# Patient Record
Sex: FEMALE | Race: BLACK OR AFRICAN AMERICAN | NOT HISPANIC OR LATINO | URBAN - METROPOLITAN AREA
[De-identification: names, ages, dates, MRNs, and addresses within clinical notes are randomized per-mention and may not be internally consistent; named-entity substitution may affect disease eponyms.]

---

## 2023-08-08 ENCOUNTER — HOSPITAL ENCOUNTER (OUTPATIENT)
Dept: DATA CONVERSION | Facility: HOSPITAL | Age: 48
Discharge: HOME | End: 2023-08-09

## 2023-08-08 DIAGNOSIS — R06.02 SHORTNESS OF BREATH: ICD-10-CM

## 2023-08-08 DIAGNOSIS — K21.9 GASTRO-ESOPHAGEAL REFLUX DISEASE WITHOUT ESOPHAGITIS: ICD-10-CM

## 2023-08-08 DIAGNOSIS — Z20.822 CONTACT WITH AND (SUSPECTED) EXPOSURE TO COVID-19: ICD-10-CM

## 2023-08-08 DIAGNOSIS — R42 DIZZINESS AND GIDDINESS: ICD-10-CM

## 2023-08-08 DIAGNOSIS — J45.909 UNSPECIFIED ASTHMA, UNCOMPLICATED (HHS-HCC): ICD-10-CM

## 2023-08-08 LAB
ANION GAP SERPL CALCULATED.3IONS-SCNC: 11 MMOL/L (ref 0–19)
BASOPHILS # BLD AUTO: 0.02 K/UL (ref 0–0.22)
BASOPHILS NFR BLD AUTO: 0.2 % (ref 0–1)
BUN SERPL-MCNC: 6 MG/DL (ref 8–25)
BUN/CREAT SERPL: 7.5 RATIO (ref 8–21)
CALCIUM SERPL-MCNC: 9.4 MG/DL (ref 8.5–10.4)
CHLORIDE SERPL-SCNC: 101 MMOL/L (ref 97–107)
CO2 SERPL-SCNC: 28 MMOL/L (ref 24–31)
CREAT SERPL-MCNC: 0.8 MG/DL (ref 0.4–1.6)
D DIMER PPP FEU-MCNC: 0.3 MG/L FEU (ref 0.19–0.5)
DEPRECATED RDW RBC AUTO: 43.7 FL (ref 37–54)
DIFFERENTIAL METHOD BLD: ABNORMAL
EOSINOPHIL # BLD AUTO: 0.12 K/UL (ref 0–0.45)
EOSINOPHIL NFR BLD: 1.3 % (ref 0–3)
ERYTHROCYTE [DISTWIDTH] IN BLOOD BY AUTOMATED COUNT: 13 % (ref 11.7–15)
GFR SERPL CREATININE-BSD FRML MDRD: 92 ML/MIN/1.73 M2
GLUCOSE SERPL-MCNC: 104 MG/DL (ref 65–99)
HCG SERPL-ACNC: 1 MIU/ML
HCT VFR BLD AUTO: 33.3 % (ref 36–44)
HGB BLD-MCNC: 10.8 GM/DL (ref 12–15)
HS TROPONIN T DELTA: 1 (ref 0–4)
HS TROPONIN T DELTA: NORMAL (ref 0–4)
IMM GRANULOCYTES # BLD AUTO: 0.02 K/UL (ref 0–0.1)
LYMPHOCYTES # BLD AUTO: 2.6 K/UL (ref 1.2–3.2)
LYMPHOCYTES NFR BLD MANUAL: 27.9 % (ref 20–40)
MCH RBC QN AUTO: 29.7 PG (ref 26–34)
MCHC RBC AUTO-ENTMCNC: 32.4 % (ref 31–37)
MCV RBC AUTO: 91.5 FL (ref 80–100)
MONOCYTES # BLD AUTO: 0.61 K/UL (ref 0–0.8)
MONOCYTES NFR BLD MANUAL: 6.5 % (ref 0–8)
NEUTROPHILS # BLD AUTO: 5.96 K/UL
NEUTROPHILS # BLD AUTO: 5.96 K/UL (ref 1.8–7.7)
NEUTROPHILS.IMMATURE NFR BLD: 0.2 % (ref 0–1)
NEUTS SEG NFR BLD: 63.9 % (ref 50–70)
NOTE (COV): NORMAL
NRBC BLD-RTO: 0 /100 WBC
PLATELET # BLD AUTO: 268 K/UL (ref 150–450)
PMV BLD AUTO: 10.3 CU (ref 7–12.6)
POTASSIUM SERPL-SCNC: 3.2 MMOL/L (ref 3.4–5.1)
RBC # BLD AUTO: 3.64 M/UL (ref 4–4.9)
SARS-COV-2 RNA RESP QL NAA+PROBE: NEGATIVE
SODIUM SERPL-SCNC: 140 MMOL/L (ref 133–145)
SPECIMEN SOURCE (COV): NORMAL
TROPONIN T SERPL-MCNC: 6 NG/L
TROPONIN T SERPL-MCNC: 7 NG/L
WBC # BLD AUTO: 9.3 K/UL (ref 4.5–11)

## 2023-09-05 ENCOUNTER — HOSPITAL ENCOUNTER (OUTPATIENT)
Dept: DATA CONVERSION | Facility: HOSPITAL | Age: 48
Discharge: HOME | End: 2023-09-05
Payer: COMMERCIAL

## 2023-09-05 DIAGNOSIS — Z13.6 ENCOUNTER FOR SCREENING FOR CARDIOVASCULAR DISORDERS: ICD-10-CM

## 2023-09-05 DIAGNOSIS — Z12.31 ENCOUNTER FOR SCREENING MAMMOGRAM FOR MALIGNANT NEOPLASM OF BREAST: ICD-10-CM

## 2023-10-05 PROBLEM — K21.9 GASTRO-ESOPHAGEAL REFLUX DISEASE WITHOUT ESOPHAGITIS: Status: ACTIVE | Noted: 2023-10-05

## 2023-10-05 PROBLEM — E01.0 THYROMEGALY: Status: ACTIVE | Noted: 2023-10-05

## 2023-10-05 PROBLEM — R06.00 DYSPNEA: Status: ACTIVE | Noted: 2023-10-05

## 2023-10-05 PROBLEM — R11.0 DAILY NAUSEA: Status: ACTIVE | Noted: 2023-10-05

## 2023-10-05 PROBLEM — Z86.010 HISTORY OF COLONIC POLYPS: Status: ACTIVE | Noted: 2023-10-05

## 2023-10-05 PROBLEM — R10.13 EPIGASTRIC PAIN: Status: ACTIVE | Noted: 2023-10-05

## 2023-10-05 PROBLEM — I10 ESSENTIAL HYPERTENSION: Status: ACTIVE | Noted: 2023-10-05

## 2023-10-05 PROBLEM — Z86.0100 HISTORY OF COLONIC POLYPS: Status: ACTIVE | Noted: 2023-10-05

## 2023-10-05 PROBLEM — K62.5 BRIGHT RED BLOOD PER RECTUM: Status: ACTIVE | Noted: 2023-10-05

## 2023-10-05 PROBLEM — D12.6 ADENOMATOUS POLYP OF COLON: Status: ACTIVE | Noted: 2023-10-05

## 2023-10-05 RX ORDER — OLOPATADINE HYDROCHLORIDE 1 MG/ML
1 SOLUTION/ DROPS OPHTHALMIC 2 TIMES DAILY
COMMUNITY

## 2023-10-05 RX ORDER — LEVOCETIRIZINE DIHYDROCHLORIDE 5 MG/1
1 TABLET, FILM COATED ORAL EVERY EVENING
COMMUNITY

## 2023-10-05 RX ORDER — FAMOTIDINE 40 MG/1
1 TABLET, FILM COATED ORAL DAILY
COMMUNITY
Start: 2023-08-15

## 2023-10-05 RX ORDER — IBUPROFEN 600 MG/1
1 TABLET ORAL EVERY 6 HOURS PRN
COMMUNITY

## 2023-10-05 RX ORDER — LORATADINE 10 MG/1
1 TABLET ORAL DAILY
COMMUNITY

## 2023-10-05 RX ORDER — OXYCODONE AND ACETAMINOPHEN 5; 325 MG/1; MG/1
1 TABLET ORAL EVERY 4 HOURS PRN
COMMUNITY
End: 2023-10-06 | Stop reason: WASHOUT

## 2023-10-05 RX ORDER — MONTELUKAST SODIUM 10 MG/1
1 TABLET ORAL EVERY EVENING
COMMUNITY
End: 2023-10-26

## 2023-10-05 RX ORDER — DOCUSATE SODIUM 100 MG/1
1 CAPSULE, LIQUID FILLED ORAL 2 TIMES DAILY
COMMUNITY

## 2023-10-05 RX ORDER — PANTOPRAZOLE SODIUM 40 MG/1
1 TABLET, DELAYED RELEASE ORAL DAILY
COMMUNITY
Start: 2021-07-01 | End: 2023-10-06 | Stop reason: WASHOUT

## 2023-10-05 RX ORDER — SERTRALINE HYDROCHLORIDE 25 MG/1
1 TABLET, FILM COATED ORAL DAILY
COMMUNITY
End: 2023-10-16

## 2023-10-05 RX ORDER — TRIAMTERENE/HYDROCHLOROTHIAZID 37.5-25 MG
1 TABLET ORAL EVERY MORNING
COMMUNITY
End: 2024-01-22

## 2023-10-05 RX ORDER — ALBUTEROL SULFATE 90 UG/1
2 AEROSOL, METERED RESPIRATORY (INHALATION) EVERY 4 HOURS PRN
COMMUNITY

## 2023-10-05 RX ORDER — OMEPRAZOLE 20 MG/1
1 TABLET, DELAYED RELEASE ORAL DAILY
COMMUNITY
End: 2023-10-06 | Stop reason: WASHOUT

## 2023-10-06 ENCOUNTER — TELEMEDICINE (OUTPATIENT)
Dept: PRIMARY CARE | Facility: CLINIC | Age: 48
End: 2023-10-06
Payer: COMMERCIAL

## 2023-10-06 DIAGNOSIS — J01.90 ACUTE NON-RECURRENT SINUSITIS, UNSPECIFIED LOCATION: Primary | ICD-10-CM

## 2023-10-06 PROCEDURE — 99212 OFFICE O/P EST SF 10 MIN: CPT | Performed by: INTERNAL MEDICINE

## 2023-10-06 RX ORDER — PROMETHAZINE HYDROCHLORIDE AND DEXTROMETHORPHAN HYDROBROMIDE 6.25; 15 MG/5ML; MG/5ML
5 SYRUP ORAL EVERY 4 HOURS PRN
Qty: 120 ML | Refills: 0 | Status: SHIPPED | OUTPATIENT
Start: 2023-10-06 | End: 2023-11-05

## 2023-10-06 RX ORDER — AZITHROMYCIN 250 MG/1
TABLET, FILM COATED ORAL
Qty: 6 TABLET | Refills: 0 | Status: SHIPPED | OUTPATIENT
Start: 2023-10-06 | End: 2023-10-11

## 2023-10-06 ASSESSMENT — PATIENT HEALTH QUESTIONNAIRE - PHQ9
2. FEELING DOWN, DEPRESSED OR HOPELESS: NOT AT ALL
1. LITTLE INTEREST OR PLEASURE IN DOING THINGS: NOT AT ALL
SUM OF ALL RESPONSES TO PHQ9 QUESTIONS 1 AND 2: 0

## 2023-10-06 ASSESSMENT — ENCOUNTER SYMPTOMS
CHOKING: 0
RHINORRHEA: 1
VOMITING: 0
DIZZINESS: 0
HEADACHES: 0
SINUS PAIN: 1
LOSS OF SENSATION IN FEET: 0
WHEEZING: 0
SORE THROAT: 0
NAUSEA: 0
DEPRESSION: 0
SHORTNESS OF BREATH: 0
OCCASIONAL FEELINGS OF UNSTEADINESS: 0
VOICE CHANGE: 1
COUGH: 1
CHILLS: 1
FEVER: 1

## 2023-10-06 NOTE — LETTER
October 6, 2023     Patient: Sheila Templeton   YOB: 1975   Date of Visit: 10/6/2023       To Whom It May Concern:    Sheila Templeton was seen in my clinic on 10/6/2023 at 2:30 pm. Please excuse Sheila for her absence from work on this day to make the appointment. Please also excuse her absences on 10/2 and 10/5.    If you have any questions or concerns, please don't hesitate to call.         Sincerely,         Hui Javier MD        CC: No Recipients

## 2023-10-06 NOTE — PROGRESS NOTES
Subjective   Patient ID: Sheila Templeton is a 47 y.o. female who presents for Sick Visit (Cold. Went to urgent care yesterday, received a COVID test (negative)).    This is a 47 y.o. presenting with nasal congestion, rhinorrhea, cough, tactile fever and chills x 5 days. She went to the  yesterday and was diagnoaed with sinusitis and was advised to take Flonase and provided with a rx for Benzonatate. She states that she is feeling no better.          Review of Systems   Constitutional:  Positive for chills and fever.   HENT:  Positive for congestion, rhinorrhea, sinus pain and voice change. Negative for sore throat.    Respiratory:  Positive for cough. Negative for choking, shortness of breath and wheezing.    Cardiovascular:  Negative for chest pain.   Gastrointestinal:  Negative for nausea and vomiting.   Neurological:  Negative for dizziness and headaches.       Objective   There were no vitals taken for this visit.    Physical Exam  Constitutional:       General: She is not in acute distress.     Appearance: She is ill-appearing. She is not toxic-appearing.   Neurological:      General: No focal deficit present.      Mental Status: She is alert and oriented to person, place, and time.   Psychiatric:         Mood and Affect: Mood normal.         Assessment/Plan   Diagnoses and all orders for this visit:  Acute non-recurrent sinusitis, unspecified location  -     azithromycin (Zithromax) 250 mg tablet; Take 2 tablets (500 mg) by mouth once daily for 1 day, THEN 1 tablet (250 mg) once daily for 4 days. Take 2 tabs (500 mg) by mouth today, than 1 daily for 4 days..  -     promethazine-DM (Phenergan-DM) 6.25-15 mg/5 mL syrup; Take 5 mL by mouth every 4 hours if needed for cough.

## 2023-10-13 ENCOUNTER — TELEPHONE (OUTPATIENT)
Dept: PRIMARY CARE | Facility: CLINIC | Age: 48
End: 2023-10-13
Payer: COMMERCIAL

## 2023-10-13 NOTE — TELEPHONE ENCOUNTER
Pt called stating PCP prescribed a zpack last week and she still is hoarse and has a cough. Pt is scheduled to come in on the 19th but was not sure if she needs to be seen sooner. Pt is also asking if PCP has any recommendations for pediatrics for her grandson. Please advise.

## 2023-10-16 NOTE — TELEPHONE ENCOUNTER
She can be seen in [facility], if I have no earlier availability. She can try [facility] for her grandson.

## 2023-10-19 ENCOUNTER — OFFICE VISIT (OUTPATIENT)
Dept: PRIMARY CARE | Facility: CLINIC | Age: 48
End: 2023-10-19
Payer: COMMERCIAL

## 2023-10-19 VITALS
WEIGHT: 244.4 LBS | HEART RATE: 85 BPM | SYSTOLIC BLOOD PRESSURE: 120 MMHG | TEMPERATURE: 97.3 F | OXYGEN SATURATION: 97 % | DIASTOLIC BLOOD PRESSURE: 78 MMHG | BODY MASS INDEX: 43.29 KG/M2

## 2023-10-19 DIAGNOSIS — J20.9 ACUTE BRONCHITIS, UNSPECIFIED ORGANISM: Primary | ICD-10-CM

## 2023-10-19 PROCEDURE — 2500000004 HC RX 250 GENERAL PHARMACY W/ HCPCS (ALT 636 FOR OP/ED): Performed by: INTERNAL MEDICINE

## 2023-10-19 PROCEDURE — 96372 THER/PROPH/DIAG INJ SC/IM: CPT | Performed by: INTERNAL MEDICINE

## 2023-10-19 PROCEDURE — 3074F SYST BP LT 130 MM HG: CPT | Performed by: INTERNAL MEDICINE

## 2023-10-19 PROCEDURE — 3078F DIAST BP <80 MM HG: CPT | Performed by: INTERNAL MEDICINE

## 2023-10-19 PROCEDURE — 1036F TOBACCO NON-USER: CPT | Performed by: INTERNAL MEDICINE

## 2023-10-19 PROCEDURE — 99213 OFFICE O/P EST LOW 20 MIN: CPT | Performed by: INTERNAL MEDICINE

## 2023-10-19 RX ORDER — METHYLPREDNISOLONE ACETATE 80 MG/ML
80 INJECTION, SUSPENSION INTRA-ARTICULAR; INTRALESIONAL; INTRAMUSCULAR; SOFT TISSUE ONCE
Status: COMPLETED | OUTPATIENT
Start: 2023-10-19 | End: 2023-10-19

## 2023-10-19 RX ORDER — HYDROCODONE BITARTRATE AND HOMATROPINE METHYLBROMIDE ORAL SOLUTION 5; 1.5 MG/5ML; MG/5ML
5 LIQUID ORAL EVERY 6 HOURS PRN
Qty: 100 ML | Refills: 0 | Status: SHIPPED | OUTPATIENT
Start: 2023-10-19 | End: 2023-10-24

## 2023-10-19 RX ORDER — DOXYCYCLINE 100 MG/1
100 CAPSULE ORAL 2 TIMES DAILY
Qty: 14 CAPSULE | Refills: 0 | Status: SHIPPED | OUTPATIENT
Start: 2023-10-19 | End: 2023-10-26

## 2023-10-19 RX ADMIN — METHYLPREDNISOLONE ACETATE 80 MG: 80 INJECTION, SUSPENSION INTRA-ARTICULAR; INTRALESIONAL; INTRAMUSCULAR; SOFT TISSUE at 11:17

## 2023-10-19 ASSESSMENT — PATIENT HEALTH QUESTIONNAIRE - PHQ9
1. LITTLE INTEREST OR PLEASURE IN DOING THINGS: NOT AT ALL
SUM OF ALL RESPONSES TO PHQ9 QUESTIONS 1 AND 2: 0
2. FEELING DOWN, DEPRESSED OR HOPELESS: NOT AT ALL

## 2023-10-19 ASSESSMENT — ENCOUNTER SYMPTOMS
LOSS OF SENSATION IN FEET: 0
OCCASIONAL FEELINGS OF UNSTEADINESS: 0
DEPRESSION: 0

## 2023-10-19 NOTE — PROGRESS NOTES
Subjective   Patient ID: Sheila Templeton is a 47 y.o. female who presents for Cough (Hot flashes and chills. ).    This is a 47 y.o. presenting with continued cough and congestion x 2 weeks. She was treated with Zithromax and Promethazine DM and felt better briefly, but her symptoms returned. She has been using her inhaler with not much relief. She has not had fever, but admits to chills and hot flashes. She is also having urinary incontinence due to coughing. No wheezing, dizziness.         Review of Systems   All other systems reviewed and are negative.      Objective   /78   Pulse 85   Temp 36.3 °C (97.3 °F)   Wt 111 kg (244 lb 6.4 oz)   SpO2 97%   BMI 43.29 kg/m²     Physical Exam  Constitutional:       General: She is not in acute distress.     Appearance: She is ill-appearing. She is not toxic-appearing.   HENT:      Nose: Nose normal.   Cardiovascular:      Rate and Rhythm: Normal rate and regular rhythm.      Heart sounds: Normal heart sounds.   Pulmonary:      Effort: Pulmonary effort is normal. No respiratory distress.      Breath sounds: Normal breath sounds. No stridor. No wheezing, rhonchi or rales.   Skin:     General: Skin is warm and dry.   Neurological:      General: No focal deficit present.      Mental Status: She is alert and oriented to person, place, and time.   Psychiatric:         Mood and Affect: Mood normal.         Assessment/Plan   Diagnoses and all orders for this visit:  Acute bronchitis, unspecified organism  -     doxycycline (Vibramycin) 100 mg capsule; Take 1 capsule (100 mg) by mouth 2 times a day for 7 days. Take with at least 8 ounces (large glass) of water, do not lie down for 30 minutes after  -     hydrocodone-homatropine (Hycodan) 5-1.5 mg/5 mL syrup; Take 5 mL by mouth every 6 hours if needed for cough for up to 5 days.  -     methylPREDNISolone acetate (DEPO-Medrol) injection 80 mg

## 2023-10-19 NOTE — LETTER
October 19, 2023     Patient: Sheila Templeton   YOB: 1975   Date of Visit: 10/19/2023       To Whom It May Concern:    Sheila Templeton was seen in my clinic on 10/19/2023 at 10:45 am. Please excuse Sheila for her absence from work on this day to make the appointment.    If you have any questions or concerns, please don't hesitate to call.         Sincerely,         Hui Javier MD        CC: No Recipients

## 2023-10-26 DIAGNOSIS — L20.9 ATOPIC DERMATITIS, UNSPECIFIED: ICD-10-CM

## 2023-10-26 RX ORDER — MONTELUKAST SODIUM 10 MG/1
10 TABLET ORAL EVERY EVENING
Qty: 90 TABLET | Refills: 3 | Status: SHIPPED | OUTPATIENT
Start: 2023-10-26 | End: 2024-02-02 | Stop reason: ALTCHOICE

## 2023-10-26 RX ORDER — HYDROCORTISONE 25 MG/G
CREAM TOPICAL
Qty: 56.7 G | Refills: 3 | Status: SHIPPED | OUTPATIENT
Start: 2023-10-26

## 2024-02-02 ENCOUNTER — OFFICE VISIT (OUTPATIENT)
Dept: PRIMARY CARE | Facility: CLINIC | Age: 49
End: 2024-02-02
Payer: COMMERCIAL

## 2024-02-02 VITALS
DIASTOLIC BLOOD PRESSURE: 80 MMHG | HEART RATE: 73 BPM | SYSTOLIC BLOOD PRESSURE: 122 MMHG | TEMPERATURE: 97.1 F | BODY MASS INDEX: 43.22 KG/M2 | WEIGHT: 244 LBS | OXYGEN SATURATION: 97 %

## 2024-02-02 DIAGNOSIS — Z00.00 PHYSICAL EXAM: Primary | ICD-10-CM

## 2024-02-02 DIAGNOSIS — E66.01 CLASS 3 SEVERE OBESITY DUE TO EXCESS CALORIES WITHOUT SERIOUS COMORBIDITY WITH BODY MASS INDEX (BMI) OF 40.0 TO 44.9 IN ADULT (MULTI): ICD-10-CM

## 2024-02-02 PROCEDURE — 3008F BODY MASS INDEX DOCD: CPT | Performed by: INTERNAL MEDICINE

## 2024-02-02 PROCEDURE — 3074F SYST BP LT 130 MM HG: CPT | Performed by: INTERNAL MEDICINE

## 2024-02-02 PROCEDURE — 3079F DIAST BP 80-89 MM HG: CPT | Performed by: INTERNAL MEDICINE

## 2024-02-02 PROCEDURE — 1036F TOBACCO NON-USER: CPT | Performed by: INTERNAL MEDICINE

## 2024-02-02 PROCEDURE — 99396 PREV VISIT EST AGE 40-64: CPT | Performed by: INTERNAL MEDICINE

## 2024-02-02 ASSESSMENT — PAIN SCALES - GENERAL: PAINLEVEL: 0-NO PAIN

## 2024-02-02 NOTE — PROGRESS NOTES
Subjective   Patient ID: Sheila Templeton is a 48 y.o. female who presents for Annual Exam.    HPI  1.  Physical exam. Pt is interested in weight loss mgmt. She is eating healthier and has started exercising. She is inquiring about Zepbound.  Pap: last done, s/p KASIA  Mammogram: last done 9/2023  Colonoscopy: last done 2016      Review of Systems  All pertinent positive symptoms are included in the history of present illness.    All other systems have been reviewed and are negative and noncontributory to this patient's current ailments.    Past Medical History:   Diagnosis Date    Other specified postprocedural states     History of myomectomy    Personal history of other benign neoplasm     History of uterine leiomyoma     Past Surgical History:   Procedure Laterality Date    ENDOMETRIAL ABLATION  05/02/2016    [facility] Thermal Endometrial Ablation    OTHER SURGICAL HISTORY  05/02/2016    Laparoscopy Myomectomy    TUBAL LIGATION  05/02/2016    Tubal Ligation     Social History     Tobacco Use    Smoking status: Never    Smokeless tobacco: Never   Vaping Use    Vaping Use: Never used   Substance Use Topics    Alcohol use: Never    Drug use: Never     Family History   Problem Relation Name Age of Onset    Hypertension Mother      No Known Problems Father      Asthma Brother      No Known Problems Daughter      No Known Problems Son      Ovarian cancer Maternal Grandmother       Allergies   Allergen Reactions    Fluconazole Anaphylaxis and Unknown     Immunization History   Administered Date(s) Administered    Tdap vaccine, age 7 year and older (BOOSTRIX, ADACEL) 11/08/2011     Current Outpatient Medications   Medication Instructions    albuterol (Ventolin HFA) 90 mcg/actuation inhaler 2 puffs, inhalation, Every 4 hours PRN    BACILLUS COAGULANS-INULIN ORAL 1 capsule, oral, Daily    docusate sodium (Colace) 100 mg capsule 1 capsule, oral, 2 times daily    famotidine (Pepcid) 40 mg tablet 1 tablet, oral,  Daily    hydrocortisone 2.5 % cream 1 APPLICATION TO AFFECTED AREA AS NEEDED EXTERNALLY TWICE A DAY    ibuprofen 600 mg tablet 1 tablet, oral, Every 6 hours PRN    levocetirizine (Xyzal) 5 mg tablet 1 tablet, oral, Every evening    loratadine (Claritin) 10 mg tablet 1 tablet, oral, Daily    olopatadine (Patanol) 0.1 % ophthalmic solution 1 drop, ophthalmic (eye), 2 times daily    sertraline (ZOLOFT) 25 mg, oral, Daily    triamterene-hydrochlorothiazid (Maxzide-25) 37.5-25 mg tablet 1 tablet, oral, Daily before breakfast     Objective   Visit Vitals  /80   Pulse 73   Temp 36.2 °C (97.1 °F)   Wt 111 kg (244 lb)   SpO2 97%   BMI 43.22 kg/m²   Smoking Status Never   BSA 2.22 m²     No visits with results within 1 Month(s) from this visit.   Latest known visit with results is:   Legacy Encounter on 12/22/2022   Component Date Value    WBC 12/22/2022 9.3     RBC 12/22/2022 4.18     Hemoglobin 12/22/2022 12.2     Hematocrit 12/22/2022 38.8     MCV 12/22/2022 92.8     MCH 12/22/2022 29.2     MCHC 12/22/2022 31.4     RDW-SD 12/22/2022 45.2     RDW-CV 12/22/2022 13.2     Platelets 12/22/2022 347     MPV 12/22/2022 10.7     nRBC 12/22/2022 0     Calcium 12/22/2022 9.4     AST 12/22/2022 14     Alkaline Phosphatase 12/22/2022 78     Total Bilirubin 12/22/2022 0.5     Total Protein 12/22/2022 7.7     Albumin 12/22/2022 3.8     Globulin, Total 12/22/2022 3.9 (H)     A/G Ratio 12/22/2022 1.0 (L)     Sodium 12/22/2022 141     Potassium 12/22/2022 3.7     Chloride 12/22/2022 104     Bicarbonate 12/22/2022 28     Anion Gap 12/22/2022 9     Urea Nitrogen 12/22/2022 7 (L)     Creatinine 12/22/2022 0.9     Urea Nitrogen/Creatinine* 12/22/2022 7.8 (L)     Glucose 12/22/2022 105 (H)     ALT (SGPT) 12/22/2022 10     ESTIMATED GFR 12/22/2022 80     SERUM COLOR 12/22/2022 YELLOW     SERUM CLARITY 12/22/2022 CLEAR     Is the patient fasting? 12/22/2022 FASTING     Cholesterol 12/22/2022 182     Triglycerides 12/22/2022 89     HDL  12/22/2022 62     LDL Calculated 12/22/2022 102     Cholesterol/HDL Ratio 12/22/2022 2.9     Thyroid Stimulating Horm* 12/22/2022 1.43     Microscopic 12/22/2022 AUTOMATIC MICROSCOPIC URINES     WBC, Urine 12/22/2022 1     RBC, Urine 12/22/2022 2     Bacteria, Urine 12/22/2022 NEGATIVE     Squamous Epithelial Cell* 12/22/2022 FEW     Hyaline Casts, Urine 12/22/2022 NONE SEEN     Color, Urine 12/22/2022 PALE YELLOW     Appearance, Urine 12/22/2022 CLEAR     Specific Gravity, Urine 12/22/2022 1.020     pH, Urine 12/22/2022 6.0     Leukocyte Esterase, Urine 12/22/2022 TRACE (A)     Nitrite, Urine 12/22/2022 NEGATIVE     Protein, Urine 12/22/2022 TRACE (A)     Glucose, Urine 12/22/2022 NEGATIVE     Ketones, Urine 12/22/2022 NEGATIVE     Urobilinogen, Urine 12/22/2022 NORMAL     Bilirubin, Urine 12/22/2022 NEGATIVE     Blood, Urine 12/22/2022 NEGATIVE     Urine Culture 12/22/2022                      Value:CULTURE BEING ORDERED BASED ON LEUKOCYTE ESTERASE  Performed at [address] [address]      Specimen Description 12/22/2022                      Value:CLEAN VOIDED MIDSTREAM Performed at [address] [address]      Special Requests 12/22/2022 NONE Performed at [address] [address]     [place] Count 12/22/2022 >100,000 CFU/mL     RESULTS 12/22/2022                      Value:NORMAL UROGENITAL JUAN  INCLUDING  STREPTOCOCCUS AGALACTIAE_GROUP   B(BETA)  Performed at [facility],[address] [place] [id number]      REPORT STATUS - SQ AKUA* 12/22/2022 FINAL 12/23/2022      Physical Exam  CONSTITUTIONAL - well nourished, well developed, looks like stated age, in no acute distress, not ill-appearing, and not tired appearing  SKIN - normal skin color and pigmentation, normal skin turgor without rash, lesions, or nodules visualized  HEAD - no trauma, normocephalic  EYES - pupils are equal and reactive to light, extraocular muscles are intact, and  normal external exam  ENT - TM's intact, no injection, no signs of infection, uvula midline, normal tongue movement and throat normal, no exudate, nasal passage without discharge and patent  NECK - supple without rigidity, no neck mass was observed, no thyromegaly or thyroid nodules  CHEST - clear to auscultation, no wheezing, no crackles and no rales, good effort  CARDIAC - regular rate and regular rhythm, no skipped beats, no murmur  ABDOMEN - no organomegaly, soft, nontender, nondistended, normal bowel sounds, no guarding/rebound/rigidity, negative McBurney sign and negative Rubio sign  EXTREMITIES - no edema, no deformities  NEUROLOGICAL - normal gait, normal balance, normal motor, no ataxia; alert, oriented and no focal signs  PSYCHIATRIC - alert, pleasant and cordial, age-appropriate  IMMUNOLOGIC - no cervical lymphadenopathy    Assessment/Plan   Problem List Items Addressed This Visit    None  Visit Diagnoses       Physical exam    -  Primary    Relevant Orders    Lipid Panel    Comprehensive Metabolic Panel    CBC    Hemoglobin A1C    Class 3 severe obesity due to excess calories without serious comorbidity with body mass index (BMI) of 40.0 to 44.9 in adult (CMS/[facility])        Will await lab results to determine which wt loss aid to prescribe         Healthy diet and exercise encouraged. Low fat, low salt diet. Drink plenty of fluids. Exercise at least 5 times/week for at least 45 minutes per day.

## 2024-02-03 ENCOUNTER — LAB (OUTPATIENT)
Dept: LAB | Facility: LAB | Age: 49
End: 2024-02-03
Payer: COMMERCIAL

## 2024-02-03 DIAGNOSIS — Z00.00 PHYSICAL EXAM: ICD-10-CM

## 2024-02-03 LAB
ALBUMIN SERPL BCP-MCNC: 3.8 G/DL (ref 3.4–5)
ALP SERPL-CCNC: 68 U/L (ref 33–110)
ALT SERPL W P-5'-P-CCNC: 11 U/L (ref 7–45)
ANION GAP SERPL CALC-SCNC: 11 MMOL/L (ref 10–20)
AST SERPL W P-5'-P-CCNC: 11 U/L (ref 9–39)
BILIRUB SERPL-MCNC: 0.5 MG/DL (ref 0–1.2)
BUN SERPL-MCNC: 10 MG/DL (ref 6–23)
CALCIUM SERPL-MCNC: 9.3 MG/DL (ref 8.6–10.6)
CHLORIDE SERPL-SCNC: 102 MMOL/L (ref 98–107)
CHOLEST SERPL-MCNC: 162 MG/DL (ref 0–199)
CHOLESTEROL/HDL RATIO: 2.8
CO2 SERPL-SCNC: 27 MMOL/L (ref 21–32)
CREAT SERPL-MCNC: 0.79 MG/DL (ref 0.5–1.05)
EGFRCR SERPLBLD CKD-EPI 2021: >90 ML/MIN/1.73M*2
ERYTHROCYTE [DISTWIDTH] IN BLOOD BY AUTOMATED COUNT: 13.5 % (ref 11.5–14.5)
EST. AVERAGE GLUCOSE BLD GHB EST-MCNC: 111 MG/DL
GLUCOSE SERPL-MCNC: 92 MG/DL (ref 74–99)
HBA1C MFR BLD: 5.5 %
HCT VFR BLD AUTO: 36.6 % (ref 36–46)
HDLC SERPL-MCNC: 57.5 MG/DL
HGB BLD-MCNC: 11.9 G/DL (ref 12–16)
LDLC SERPL CALC-MCNC: 87 MG/DL
MCH RBC QN AUTO: 30 PG (ref 26–34)
MCHC RBC AUTO-ENTMCNC: 32.5 G/DL (ref 32–36)
MCV RBC AUTO: 92 FL (ref 80–100)
NON HDL CHOLESTEROL: 105 MG/DL (ref 0–149)
NRBC BLD-RTO: 0 /100 WBCS (ref 0–0)
PLATELET # BLD AUTO: 286 X10*3/UL (ref 150–450)
POTASSIUM SERPL-SCNC: 3.5 MMOL/L (ref 3.5–5.3)
PROT SERPL-MCNC: 7.2 G/DL (ref 6.4–8.2)
RBC # BLD AUTO: 3.97 X10*6/UL (ref 4–5.2)
SODIUM SERPL-SCNC: 136 MMOL/L (ref 136–145)
TRIGL SERPL-MCNC: 87 MG/DL (ref 0–149)
VLDL: 17 MG/DL (ref 0–40)
WBC # BLD AUTO: 9.2 X10*3/UL (ref 4.4–11.3)

## 2024-02-03 PROCEDURE — 83036 HEMOGLOBIN GLYCOSYLATED A1C: CPT

## 2024-02-03 PROCEDURE — 80053 COMPREHEN METABOLIC PANEL: CPT

## 2024-02-03 PROCEDURE — 80061 LIPID PANEL: CPT

## 2024-02-03 PROCEDURE — 85027 COMPLETE CBC AUTOMATED: CPT

## 2024-02-03 PROCEDURE — 36415 COLL VENOUS BLD VENIPUNCTURE: CPT

## 2024-03-08 PROBLEM — J45.909 ASTHMA WITHOUT STATUS ASTHMATICUS (HHS-HCC): Status: ACTIVE | Noted: 2023-08-08

## 2024-03-20 ENCOUNTER — E-VISIT (OUTPATIENT)
Dept: PRIMARY CARE | Facility: CLINIC | Age: 49
End: 2024-03-20
Payer: COMMERCIAL

## 2024-03-20 DIAGNOSIS — E66.01 MORBID OBESITY WITH BODY MASS INDEX (BMI) OF 40.0 TO 44.9 IN ADULT (MULTI): ICD-10-CM

## 2024-03-20 DIAGNOSIS — N39.3 STRESS INCONTINENCE OF URINE: ICD-10-CM

## 2024-03-20 DIAGNOSIS — R10.2 PELVIC PAIN: Primary | ICD-10-CM

## 2024-03-20 NOTE — TELEPHONE ENCOUNTER
What is the referral for? Is she experiencing pelvic pain, urinary incontinence. We discussed Zepbound at her appt for weight loss. Would she like to try this medication?

## 2024-03-25 ENCOUNTER — TELEPHONE (OUTPATIENT)
Dept: PRIMARY CARE | Facility: CLINIC | Age: 49
End: 2024-03-25
Payer: COMMERCIAL

## 2024-03-25 NOTE — TELEPHONE ENCOUNTER
Per pt message below:    Sheila CLARK [facility] Clinical Support Staff (supporting Hui Javier MD)3 days ago     [facility]  Thank you. [facility] has said they need additional information before they fill thr prescription for Zepbound

## 2024-04-19 ENCOUNTER — LAB REQUISITION (OUTPATIENT)
Dept: LAB | Facility: HOSPITAL | Age: 49
End: 2024-04-19
Payer: COMMERCIAL

## 2024-04-19 DIAGNOSIS — R30.0 DYSURIA: ICD-10-CM

## 2024-04-19 PROCEDURE — 87186 SC STD MICRODIL/AGAR DIL: CPT

## 2024-04-19 PROCEDURE — 87086 URINE CULTURE/COLONY COUNT: CPT

## 2024-04-20 ENCOUNTER — LAB REQUISITION (OUTPATIENT)
Dept: LAB | Facility: HOSPITAL | Age: 49
End: 2024-04-20
Payer: COMMERCIAL

## 2024-04-20 DIAGNOSIS — R30.0 DYSURIA: ICD-10-CM

## 2024-04-22 LAB — BACTERIA UR CULT: ABNORMAL

## 2024-07-31 ENCOUNTER — HOSPITAL ENCOUNTER (EMERGENCY)
Facility: HOSPITAL | Age: 49
Discharge: HOME | End: 2024-07-31
Payer: COMMERCIAL

## 2024-07-31 VITALS
SYSTOLIC BLOOD PRESSURE: 152 MMHG | RESPIRATION RATE: 18 BRPM | TEMPERATURE: 98.4 F | WEIGHT: 230 LBS | BODY MASS INDEX: 40.75 KG/M2 | OXYGEN SATURATION: 98 % | HEART RATE: 71 BPM | DIASTOLIC BLOOD PRESSURE: 93 MMHG | HEIGHT: 63 IN

## 2024-07-31 DIAGNOSIS — R51.9 ACUTE NONINTRACTABLE HEADACHE, UNSPECIFIED HEADACHE TYPE: Primary | ICD-10-CM

## 2024-07-31 PROCEDURE — 96375 TX/PRO/DX INJ NEW DRUG ADDON: CPT

## 2024-07-31 PROCEDURE — 96374 THER/PROPH/DIAG INJ IV PUSH: CPT

## 2024-07-31 PROCEDURE — 2500000004 HC RX 250 GENERAL PHARMACY W/ HCPCS (ALT 636 FOR OP/ED)

## 2024-07-31 PROCEDURE — 99284 EMERGENCY DEPT VISIT MOD MDM: CPT

## 2024-07-31 RX ORDER — PROCHLORPERAZINE EDISYLATE 5 MG/ML
10 INJECTION INTRAMUSCULAR; INTRAVENOUS ONCE
Status: COMPLETED | OUTPATIENT
Start: 2024-07-31 | End: 2024-07-31

## 2024-07-31 RX ORDER — DIPHENHYDRAMINE HYDROCHLORIDE 50 MG/ML
25 INJECTION INTRAMUSCULAR; INTRAVENOUS ONCE
Status: COMPLETED | OUTPATIENT
Start: 2024-07-31 | End: 2024-07-31

## 2024-07-31 RX ORDER — KETOROLAC TROMETHAMINE 30 MG/ML
30 INJECTION, SOLUTION INTRAMUSCULAR; INTRAVENOUS ONCE
Status: COMPLETED | OUTPATIENT
Start: 2024-07-31 | End: 2024-07-31

## 2024-07-31 ASSESSMENT — PAIN DESCRIPTION - LOCATION: LOCATION: HEAD

## 2024-07-31 ASSESSMENT — LIFESTYLE VARIABLES
EVER FELT BAD OR GUILTY ABOUT YOUR DRINKING: NO
EVER HAD A DRINK FIRST THING IN THE MORNING TO STEADY YOUR NERVES TO GET RID OF A HANGOVER: NO
HAVE YOU EVER FELT YOU SHOULD CUT DOWN ON YOUR DRINKING: NO
TOTAL SCORE: 0
HAVE PEOPLE ANNOYED YOU BY CRITICIZING YOUR DRINKING: NO

## 2024-07-31 ASSESSMENT — PAIN DESCRIPTION - PAIN TYPE
TYPE: ACUTE PAIN
TYPE: ACUTE PAIN

## 2024-07-31 ASSESSMENT — PAIN SCALES - GENERAL
PAINLEVEL_OUTOF10: 5 - MODERATE PAIN
PAINLEVEL_OUTOF10: 5 - MODERATE PAIN

## 2024-07-31 ASSESSMENT — COLUMBIA-SUICIDE SEVERITY RATING SCALE - C-SSRS
6. HAVE YOU EVER DONE ANYTHING, STARTED TO DO ANYTHING, OR PREPARED TO DO ANYTHING TO END YOUR LIFE?: NO
2. HAVE YOU ACTUALLY HAD ANY THOUGHTS OF KILLING YOURSELF?: NO
1. IN THE PAST MONTH, HAVE YOU WISHED YOU WERE DEAD OR WISHED YOU COULD GO TO SLEEP AND NOT WAKE UP?: NO

## 2024-07-31 ASSESSMENT — PAIN - FUNCTIONAL ASSESSMENT
PAIN_FUNCTIONAL_ASSESSMENT: 0-10
PAIN_FUNCTIONAL_ASSESSMENT: 0-10

## 2024-07-31 ASSESSMENT — PAIN DESCRIPTION - DESCRIPTORS
DESCRIPTORS: ACHING
DESCRIPTORS: ACHING

## 2024-07-31 NOTE — ED PROVIDER NOTES
HPI   Chief Complaint   Patient presents with    Headache       Patient is a 48-year-old female presenting to the emergency department for evaluation of headache.  Patient states she has had a headache since this morning.  She states she has a history of migraine headaches more when she was younger.  She states she took some Tylenol and ibuprofen earlier today with not much relief in the headache.  She states that the headache is a constant throbbing pain across her forehead.  She denies any light sensitivity.  She denies any changes in vision.  She denies chest pain, shortness of breath,  fever, chills, nausea, vomiting, abdominal pain, cough, congestion, numbness, tingling.               Patient History   Past Medical History:   Diagnosis Date    Other specified postprocedural states     History of myomectomy    Personal history of other benign neoplasm     History of uterine leiomyoma     Past Surgical History:   Procedure Laterality Date    ENDOMETRIAL ABLATION  05/02/2016    [facility] Services Thermal Endometrial Ablation    OTHER SURGICAL HISTORY  05/02/2016    Laparoscopy Myomectomy    TUBAL LIGATION  05/02/2016    Tubal Ligation     Family History   Problem Relation Name Age of Onset    Hypertension Mother      No Known Problems Father      Asthma Brother      No Known Problems Daughter      No Known Problems Son      Ovarian cancer Maternal Grandmother       Social History     Tobacco Use    Smoking status: Never    Smokeless tobacco: Never   Vaping Use    Vaping status: Never Used   Substance Use Topics    Alcohol use: Never    Drug use: Never       Physical Exam   ED Triage Vitals [07/31/24 1853]   Temperature Heart Rate Respirations BP   36.9 °C (98.4 °F) 71 18 (!) 152/93      Pulse Ox Temp src Heart Rate Source Patient Position   98 % -- -- --      BP Location FiO2 (%)     -- --       Physical Exam  Vitals and nursing note reviewed.   Constitutional:       General: She is not in acute distress.      Appearance: She is well-developed. She is not ill-appearing or toxic-appearing.   HENT:      Head: Normocephalic and atraumatic.      Mouth/Throat:      Mouth: Mucous membranes are moist.   Eyes:      General: No visual field deficit.     Extraocular Movements: Extraocular movements intact.      Right eye: Normal extraocular motion and no nystagmus.      Left eye: Normal extraocular motion and no nystagmus.      Pupils: Pupils are equal, round, and reactive to light.      Right eye: Pupil is round and reactive.      Left eye: Pupil is round and reactive.   Cardiovascular:      Rate and Rhythm: Normal rate and regular rhythm.      Heart sounds: Normal heart sounds.   Pulmonary:      Effort: Pulmonary effort is normal.      Breath sounds: Normal breath sounds.   Abdominal:      Palpations: Abdomen is soft.   Musculoskeletal:         General: Normal range of motion.      Cervical back: Normal range of motion.   Skin:     General: Skin is warm and dry.   Neurological:      Mental Status: She is alert and oriented to person, place, and time.      GCS: GCS eye subscore is 4. GCS verbal subscore is 5. GCS motor subscore is 6.      Cranial Nerves: No cranial nerve deficit (2-12), dysarthria or facial asymmetry.      Sensory: No sensory deficit.      Motor: No weakness.      Gait: Gait normal.   Psychiatric:         Mood and Affect: Mood normal.         Behavior: Behavior normal.           ED Course & MDM   Diagnoses as of 07/31/24 2003   Acute nonintractable headache, unspecified headache type                       Eloina Coma Scale Score: 15                        Medical Decision Making  **Disclaimer parts of this chart have been completed using voice recognition software. Please excuse any errors of transcription.     Evaluated this patient independently and my supervising physician was available for consultation.    HPI: Detailed above.    Exam: A medically appropriate exam performed, outlined above, given the known  "history and presentation.    History obtained from: Patient    EMERGENCY DEPARTMENT COURSE and DIFFERENTIAL DIAGNOSIS/MDM:  Patient is a 48-year-old female presenting to the emergency department for evaluation of headache.  On physical exam vital signs remarkable for hypertension but otherwise stable patient is in no acute distress.  Patient neuroexam is unremarkable.  Pupils equal round reactive to light.  No visual changes, no abnormal gait.  This is not the patient's worst headache of her life.  She does admit to a history of migraines.  Patient is sitting on her phone and states that the light does not bother her.  Toradol, Compazine, and Benadryl given to the patient and patient was monitored in the emergency department for 1 hour.  Patient states she is feeling much better and her headache is gone.  Patient was discharged in stable condition.  She will follow-up with primary care physician outpatient within the next 1 to 2 days.  She will return to the emergency department with any new or worsening symptoms.    The patient presented with a chief complaint of migraine headache. The differential diagnosis associated with this patient's presentation includes tension headache, migraine, intracranial hemorrhage, subarachnoid hemorrhage.     Vitals:    Vitals:    07/31/24 1853   BP: (!) 152/93   Pulse: 71   Resp: 18   Temp: 36.9 °C (98.4 °F)   SpO2: 98%   Weight: 104 kg (230 lb)   Height: 1.6 m (5' 3\")     History Limited by:    None    Independent history obtained from:    None    External records reviewed:    None    Diagnostics interpreted by me:    None    Discussions with other clinicians:    None    Chronic conditions impacting care:    None    Social determinants of health affecting care:    None    Diagnostic tests considered but not performed: None    ED Medications managed:    Medications   ketorolac (Toradol) injection 30 mg (30 mg intravenous Given 7/31/24 1938)   prochlorperazine (Compazine) injection " 10 mg (10 mg intravenous Given 7/31/24 1939)   diphenhydrAMINE (BENADryl) injection 25 mg (25 mg intravenous Given 7/31/24 1938)       Prescription drugs considered:    None    Screenings:              Procedure  Procedures     Vicky Jaimes PA-C  07/31/24 2003

## 2024-07-31 NOTE — Clinical Note
Sheila Templeton was seen and treated in our emergency department on 7/31/2024.  She may return to work on 08/01/2024.       If you have any questions or concerns, please don't hesitate to call.      Vicky Jaimes PA-C

## 2024-07-31 NOTE — ED TRIAGE NOTES
"Pt states that she is having a bad headache that is causing her left side of her face into her shoulder to be \"tingly\" pt states that she is nauseated   "

## 2024-08-01 NOTE — DISCHARGE INSTRUCTIONS
Thank you for visiting [facility] emergency department.  It was a pleasure caring for you.    Be sure to take all medications, over the counter medications or prescription medications only as directed.     Be sure to follow up as directed in 1-2 days.  All of the details of your follow up instructions are detailed in the follow up section of this packet.    If you are being discharged with any pains medications or muscle relaxers (norco, Vicodin, hydrocodone products, Percocet, oxycodone products, flexeril, cyclobenzaprine, robaxin, norflex, brand or generic, or any other pain controlling medications with the exception of Ibuprofen and regular Tylenol, do not drive or operate machinery, climb ladders or participate in any activity that could potentially put yourself or others at risk should you get dizzy, or be/feel impaired at all.        It is important to remember that your care does not end here and you must continue to monitor your condition closely. Please return to the emergency department for any worsening or concerning signs or symptoms as directed by our conversations and the discharge instructions. Otherwise please follow up with your doctor in 2 days if no better or worse. If you do not have a doctor please contact the referral number on your discharge instructions. Please contact any physician specialists provided in your discharge notes as it is very important to follow up with them regarding your condition. If you are unable to reach the physicians provided, please come back to the Emergency Department at any time.     As always, please take medications as directed. If you have any questions at all regarding your medications, please contact the pharmacist, the emergency department, or your doctor. Before taking any medication prescribed in the Emergency Department, please review the medication side effects and drug interactions (<http://www.rxlist.com/script/main/hp.asp>) as they may interact with your  home medications.     Having trouble affording medications? Try [employer] <http://Fullscreen/>! (This is not a hospital endorsed website, merely a recommendation based on my own personal experiences with [employer])      Return to emergency room without delay for ANY new or worsening pains or for any other symptoms or concerns.      Return with worsening pains, nausea, vomiting, trouble breathing, palpitations, shortness of breath, inability to pass stool or urine, loss of control of stool or urine, any numbness or tingling (that is not normal for you), uncontrolled fevers, the passing of blood or other material in stool or urine, rashes, pains or for any other symptoms or concerns you may have.  You are always welcome to return to the ER at any time for any reason or for any other concerns you may have.

## 2024-08-13 ENCOUNTER — HOSPITAL ENCOUNTER (EMERGENCY)
Facility: HOSPITAL | Age: 49
Discharge: HOME | End: 2024-08-13
Payer: COMMERCIAL

## 2024-08-13 ENCOUNTER — APPOINTMENT (OUTPATIENT)
Dept: RADIOLOGY | Facility: HOSPITAL | Age: 49
End: 2024-08-13
Payer: COMMERCIAL

## 2024-08-13 ENCOUNTER — TELEPHONE (OUTPATIENT)
Dept: PRIMARY CARE | Facility: CLINIC | Age: 49
End: 2024-08-13

## 2024-08-13 ENCOUNTER — CLINICAL SUPPORT (OUTPATIENT)
Dept: EMERGENCY MEDICINE | Facility: HOSPITAL | Age: 49
End: 2024-08-13
Payer: COMMERCIAL

## 2024-08-13 ENCOUNTER — E-VISIT (OUTPATIENT)
Dept: PRIMARY CARE | Facility: CLINIC | Age: 49
End: 2024-08-13
Payer: COMMERCIAL

## 2024-08-13 VITALS
BODY MASS INDEX: 40.75 KG/M2 | TEMPERATURE: 98 F | OXYGEN SATURATION: 98 % | SYSTOLIC BLOOD PRESSURE: 137 MMHG | WEIGHT: 230 LBS | RESPIRATION RATE: 16 BRPM | HEIGHT: 63 IN | HEART RATE: 68 BPM | DIASTOLIC BLOOD PRESSURE: 97 MMHG

## 2024-08-13 DIAGNOSIS — I10 HYPERTENSION, UNSPECIFIED TYPE: ICD-10-CM

## 2024-08-13 DIAGNOSIS — R07.9 CHEST PAIN, UNSPECIFIED TYPE: ICD-10-CM

## 2024-08-13 DIAGNOSIS — K21.9 GASTRO-ESOPHAGEAL REFLUX DISEASE WITHOUT ESOPHAGITIS: ICD-10-CM

## 2024-08-13 DIAGNOSIS — G43.809 OTHER MIGRAINE WITHOUT STATUS MIGRAINOSUS, NOT INTRACTABLE: Primary | ICD-10-CM

## 2024-08-13 LAB
ALBUMIN SERPL BCP-MCNC: 4.1 G/DL (ref 3.4–5)
ALP SERPL-CCNC: 83 U/L (ref 33–110)
ALT SERPL W P-5'-P-CCNC: 15 U/L (ref 7–45)
ANION GAP SERPL CALC-SCNC: 12 MMOL/L (ref 10–20)
AST SERPL W P-5'-P-CCNC: 19 U/L (ref 9–39)
ATRIAL RATE: 71 BPM
B-HCG SERPL-ACNC: <3 MIU/ML
BASOPHILS # BLD AUTO: 0.03 X10*3/UL (ref 0–0.1)
BASOPHILS NFR BLD AUTO: 0.4 %
BILIRUB SERPL-MCNC: 0.3 MG/DL (ref 0–1.2)
BNP SERPL-MCNC: 6 PG/ML (ref 0–99)
BUN SERPL-MCNC: 13 MG/DL (ref 6–23)
CALCIUM SERPL-MCNC: 9.5 MG/DL (ref 8.6–10.6)
CARDIAC TROPONIN I PNL SERPL HS: <3 NG/L (ref 0–34)
CARDIAC TROPONIN I PNL SERPL HS: <3 NG/L (ref 0–34)
CHLORIDE SERPL-SCNC: 100 MMOL/L (ref 98–107)
CO2 SERPL-SCNC: 27 MMOL/L (ref 21–32)
CREAT SERPL-MCNC: 0.81 MG/DL (ref 0.5–1.05)
EGFRCR SERPLBLD CKD-EPI 2021: 90 ML/MIN/1.73M*2
EOSINOPHIL # BLD AUTO: 0.1 X10*3/UL (ref 0–0.7)
EOSINOPHIL NFR BLD AUTO: 1.5 %
ERYTHROCYTE [DISTWIDTH] IN BLOOD BY AUTOMATED COUNT: 13.2 % (ref 11.5–14.5)
GLUCOSE SERPL-MCNC: 88 MG/DL (ref 74–99)
HCT VFR BLD AUTO: 38.2 % (ref 36–46)
HGB BLD-MCNC: 12.6 G/DL (ref 12–16)
IMM GRANULOCYTES # BLD AUTO: 0.01 X10*3/UL (ref 0–0.7)
IMM GRANULOCYTES NFR BLD AUTO: 0.1 % (ref 0–0.9)
LYMPHOCYTES # BLD AUTO: 2.05 X10*3/UL (ref 1.2–4.8)
LYMPHOCYTES NFR BLD AUTO: 30.1 %
MAGNESIUM SERPL-MCNC: 1.96 MG/DL (ref 1.6–2.4)
MCH RBC QN AUTO: 29 PG (ref 26–34)
MCHC RBC AUTO-ENTMCNC: 33 G/DL (ref 32–36)
MCV RBC AUTO: 88 FL (ref 80–100)
MONOCYTES # BLD AUTO: 0.49 X10*3/UL (ref 0.1–1)
MONOCYTES NFR BLD AUTO: 7.2 %
NEUTROPHILS # BLD AUTO: 4.12 X10*3/UL (ref 1.2–7.7)
NEUTROPHILS NFR BLD AUTO: 60.7 %
NRBC BLD-RTO: 0 /100 WBCS (ref 0–0)
P AXIS: 28 DEGREES
P OFFSET: 175 MS
P ONSET: 130 MS
PLATELET # BLD AUTO: 319 X10*3/UL (ref 150–450)
POTASSIUM SERPL-SCNC: 3.9 MMOL/L (ref 3.5–5.3)
PR INTERVAL: 168 MS
PROT SERPL-MCNC: 7.8 G/DL (ref 6.4–8.2)
Q ONSET: 214 MS
QRS COUNT: 11 BEATS
QRS DURATION: 86 MS
QT INTERVAL: 414 MS
QTC CALCULATION(BAZETT): 449 MS
QTC FREDERICIA: 438 MS
R AXIS: 6 DEGREES
RBC # BLD AUTO: 4.34 X10*6/UL (ref 4–5.2)
SODIUM SERPL-SCNC: 135 MMOL/L (ref 136–145)
T AXIS: 33 DEGREES
T OFFSET: 421 MS
VENTRICULAR RATE: 71 BPM
WBC # BLD AUTO: 6.8 X10*3/UL (ref 4.4–11.3)

## 2024-08-13 PROCEDURE — 71046 X-RAY EXAM CHEST 2 VIEWS: CPT | Performed by: STUDENT IN AN ORGANIZED HEALTH CARE EDUCATION/TRAINING PROGRAM

## 2024-08-13 PROCEDURE — 70450 CT HEAD/BRAIN W/O DYE: CPT | Performed by: RADIOLOGY

## 2024-08-13 PROCEDURE — 83735 ASSAY OF MAGNESIUM: CPT | Performed by: NURSE PRACTITIONER

## 2024-08-13 PROCEDURE — 80053 COMPREHEN METABOLIC PANEL: CPT | Performed by: NURSE PRACTITIONER

## 2024-08-13 PROCEDURE — 71046 X-RAY EXAM CHEST 2 VIEWS: CPT

## 2024-08-13 PROCEDURE — 99285 EMERGENCY DEPT VISIT HI MDM: CPT

## 2024-08-13 PROCEDURE — 83880 ASSAY OF NATRIURETIC PEPTIDE: CPT | Performed by: NURSE PRACTITIONER

## 2024-08-13 PROCEDURE — 70450 CT HEAD/BRAIN W/O DYE: CPT

## 2024-08-13 PROCEDURE — 36415 COLL VENOUS BLD VENIPUNCTURE: CPT | Performed by: NURSE PRACTITIONER

## 2024-08-13 PROCEDURE — 93005 ELECTROCARDIOGRAM TRACING: CPT

## 2024-08-13 PROCEDURE — 84484 ASSAY OF TROPONIN QUANT: CPT | Performed by: NURSE PRACTITIONER

## 2024-08-13 PROCEDURE — 85025 COMPLETE CBC W/AUTO DIFF WBC: CPT | Performed by: NURSE PRACTITIONER

## 2024-08-13 PROCEDURE — 84702 CHORIONIC GONADOTROPIN TEST: CPT | Performed by: NURSE PRACTITIONER

## 2024-08-13 RX ORDER — FAMOTIDINE 40 MG/1
40 TABLET, FILM COATED ORAL NIGHTLY PRN
Qty: 20 TABLET | Refills: 0 | Status: SHIPPED | OUTPATIENT
Start: 2024-08-13 | End: 2024-09-02

## 2024-08-13 RX ORDER — FAMOTIDINE 40 MG/1
TABLET, FILM COATED ORAL
Qty: 90 TABLET | Refills: 3 | Status: SHIPPED | OUTPATIENT
Start: 2024-08-13

## 2024-08-13 RX ORDER — METOCLOPRAMIDE 10 MG/1
10 TABLET ORAL EVERY 6 HOURS
Qty: 28 TABLET | Refills: 0 | Status: SHIPPED | OUTPATIENT
Start: 2024-08-13 | End: 2024-08-20

## 2024-08-13 RX ORDER — ACETAMINOPHEN 500 MG
1000 TABLET ORAL EVERY 6 HOURS PRN
Qty: 30 TABLET | Refills: 0 | Status: SHIPPED | OUTPATIENT
Start: 2024-08-13 | End: 2024-08-23

## 2024-08-13 ASSESSMENT — PAIN DESCRIPTION - DESCRIPTORS: DESCRIPTORS: ACHING

## 2024-08-13 ASSESSMENT — LIFESTYLE VARIABLES
EVER FELT BAD OR GUILTY ABOUT YOUR DRINKING: NO
HAVE PEOPLE ANNOYED YOU BY CRITICIZING YOUR DRINKING: NO
EVER HAD A DRINK FIRST THING IN THE MORNING TO STEADY YOUR NERVES TO GET RID OF A HANGOVER: NO
HAVE YOU EVER FELT YOU SHOULD CUT DOWN ON YOUR DRINKING: NO
TOTAL SCORE: 0

## 2024-08-13 ASSESSMENT — HEART SCORE
TROPONIN: LESS THAN OR EQUAL TO NORMAL LIMIT
RISK FACTORS: 1-2 RISK FACTORS
AGE: 45-64
HISTORY: SLIGHTLY SUSPICIOUS
HEART SCORE: 2
ECG: NORMAL

## 2024-08-13 ASSESSMENT — COLUMBIA-SUICIDE SEVERITY RATING SCALE - C-SSRS
6. HAVE YOU EVER DONE ANYTHING, STARTED TO DO ANYTHING, OR PREPARED TO DO ANYTHING TO END YOUR LIFE?: NO
1. IN THE PAST MONTH, HAVE YOU WISHED YOU WERE DEAD OR WISHED YOU COULD GO TO SLEEP AND NOT WAKE UP?: NO
2. HAVE YOU ACTUALLY HAD ANY THOUGHTS OF KILLING YOURSELF?: NO

## 2024-08-13 ASSESSMENT — PAIN - FUNCTIONAL ASSESSMENT: PAIN_FUNCTIONAL_ASSESSMENT: 0-10

## 2024-08-13 ASSESSMENT — PAIN SCALES - GENERAL: PAINLEVEL_OUTOF10: 4

## 2024-08-13 ASSESSMENT — PAIN DESCRIPTION - PAIN TYPE: TYPE: ACUTE PAIN

## 2024-08-13 ASSESSMENT — PAIN DESCRIPTION - LOCATION: LOCATION: CHEST

## 2024-08-13 NOTE — TELEPHONE ENCOUNTER
Pt lm stating she went to [facility] today 08/13 for her headaches and was told she has brain compression. Pt states she has an appointment with neurology tomorrow 08/14 and an appointment with her PCP on 09/03. Pt states she would like to know if she can be seen sooner, pt states she does not mind if the visit is virtual. Please advise.

## 2024-08-13 NOTE — ED TRIAGE NOTES
Patient states that she has been having intermitant headaches, and elevated blood pressure.  She states that she takes meds blood pressure and has been compliant with her meds.  The patient states that she also has been having intermittant chest pain (mild at present) she endorses as midsternal.  She says that the other day she was also having intermittant dizziness as well.  She is here for eval  she states being seen for this at [facility] about a week prior,  worked up with headache cocktail, but cannot get into her pcp til mid September.

## 2024-08-13 NOTE — Clinical Note
Sheila Templeton was seen and treated in our emergency department on 8/13/2024.  She may return to work on 08/14/2024.       If you have any questions or concerns, please don't hesitate to call.      Mylene Silverman, APRN-CNP

## 2024-08-13 NOTE — ED PROVIDER NOTES
HPI   Chief Complaint   Patient presents with    Headache    Chest Pain         History provided by:  Patient   used: No      48-year-old female past medical history hypertension presents to the ED for evaluation 2 complaints.  First patient states she has been having a continued right frontal headache onset over a week ago.  Endorses associated nausea and photophobia.  Patient that she was seen at [facility] on onset of these symptoms, given headache cocktail and felt mildly improved.  Patient states that she did call her primary care provider but cannot be seen until September.  They stated that her headache was likely caused due to her hypertension.  She states that she does take her antihypertensive compliantly.    In addition patient is complaining of midsternal chest pressure that intermittently radiates to her right shoulder and makes her feel dizzy as she feels like the world is spinning.  She endorses associated shortness of breath.  States that it is intermittent and denies noting if rest and exertion are contributory to her symptoms.      She denies any trauma, fall, injury, recent travel, large crowds, long periods of immobility or known sick contacts.  Denies any smoking, drugs, alcohol or exogenous hormone use.  Denies any fevers, chills, vision changes, neck pain, back pain, numbness, tingling, weakness, inability to ambulate, incontinence of bowel or bladder, cough, vomiting, abdominal pain, urinary symptoms or any additional symptoms or complaints this time.    ROS is otherwise negative unless stated above.      Patient History   Past Medical History:   Diagnosis Date    Other specified postprocedural states     History of myomectomy    Personal history of other benign neoplasm     History of uterine leiomyoma     Past Surgical History:   Procedure Laterality Date    ENDOMETRIAL ABLATION  05/02/2016    [facility]necologic Services Thermal Endometrial Ablation    OTHER SURGICAL HISTORY   05/02/2016    Laparoscopy Myomectomy    TUBAL LIGATION  05/02/2016    Tubal Ligation     Family History   Problem Relation Name Age of Onset    Hypertension Mother      No Known Problems Father      Asthma Brother      No Known Problems Daughter      No Known Problems Son      Ovarian cancer Maternal Grandmother       Social History     Tobacco Use    Smoking status: Never    Smokeless tobacco: Never   Vaping Use    Vaping status: Never Used   Substance Use Topics    Alcohol use: Never    Drug use: Never       Physical Exam   ED Triage Vitals [08/13/24 0844]   Temperature Heart Rate Respirations BP   36.7 °C (98 °F) 68 16 (!) 137/97      Pulse Ox Temp Source Heart Rate Source Patient Position   98 % Temporal -- Sitting      BP Location FiO2 (%)     Left arm --       Physical Exam    VS: As documented in the triage note and EMR flowsheet from this visit were reviewed.    GEN: NAD, nontoxic, well-appearing, ambulates without difficulty  EYES:  EOMs grossly intact, anicteric sclera, no nystagmus noted, clear and equal bilaterally, no foreign body noted  HEENT: Airway patent, ears with clear tympanic membranes bilaterally. Nasal mucosa clear. Mouth with normal mucosa.  No area of abscess or fluctuance noted.  No drainage noted. Throat has no vesicles, no oropharyngeal exudates and uvula is midline. Face with no lymph node enlargement. No trismus or drooling noted.  Handling secretions.  Speech clear.  CARD: RRR, nontender chest, no crepitus deformities, no JVD, no murmurs rubs or gallops ; No edema noted.  Positive pulses bilaterally throughout.  Capillary refill less than 3 seconds.  No abnormal redness, warmth, tenderness or swelling noted to bilateral lower extremities.  PULMONARY: Clear all lung fields. Moving air well, Nonlabored, no accessory muscle use, able to speak complete sentences  ABDOMEN: Abdomen soft, non-distended, no rebound, no guarding. Bowel sounds normal in all 4 quadrants. No tenderness to  palpation.  No CVA tenderness.  No masses or organomegaly noted.  No evidence of peritonitis.   : deferred  MUSK: Spine appears normal, range of motion is not limited, no muscle or joint tenderness. Strength 5 out of 5 equal bilaterally throughout.  No step-offs, deformities or additional signs of trauma noted.  No spinal/midline tenderness to palpation  SKIN: Skin normal color for race, warm, dry and intact. No evidence of trauma. No rash noted.  NEURO: Alert and oriented x 3, speech is clear, no obvious deficits noted. No facial droop noted.  Speech clear.  Negative Kernig's and Brudzinski sign.  PSYCH: Alert and oriented to person, place, time/situation. normal mood and affect. No apparent risk to self or others. Thoughts are linear.  Does not appear decompensated.  Does not appear internally stimulated.  LYMPH: No adenopathy or splenomegaly. No cervical, supraclavicular or inguinal lymphadenopathy.  ED Course & MDM   Diagnoses as of 08/13/24 1232   Other migraine without status migrainosus, not intractable   Chest pain, unspecified type   Hypertension, unspecified type                 No data recorded     Eloina Coma Scale Score: 15 (08/13/24 0846 : Calvin Oliva, FRANCISCO)                           Medical Decision Making  Upon assessment patient is a nontoxic-appearing female in no apparent distress.  She is ambulating independently without difficulty or dyspnea.  She is neurologically and neurovascular intact.  There are no outward signs of trauma noted.  She has no clinical signs of meningitis or acute intracranial process.  I did review her visit at [facility] and because she is having same symptoms I will perform a CT head at this time to rule out any additional underlying process as a cause of her continued headache.  Additionally plan to obtain imaging and laboratory studies for chest pain workup.  I did review that she had a cardiac scoring less than 1 year ago and was negative which is reassuring.  She  declined need for medications at this time.    Workup was reviewed.  I did note the incidental finding on CT head and notified patient of this (CSF attenuation extended of the sellar region compatible with an empty sella syndrome, while findings may be incidental and underlying process such as pseudotumor cerebri cannot be excluded at this time).  I did provide her an outpatient referral for neurology for further management and long-term follow-up.  I do not believe that the patient needs LP or additional workup at this time due to her otherwise reassuring assessment.    Additionally heart score is 2 and along with a negative cardiac scoring less than 1 year ago I do not believe that she needs to be admitted for cardiac risk stratification and that she can follow-up outpatient with her primary care provider for long-term management and possible stress test if indicated/needed.    Upon reevaluation patient dates that she feels improved and reassured.  She is tolerating p.o. without difficulty.  She remained hemodynamically stable throughout my ED course of care.  Findings and plan were discussed with patient and she was agreeable for plan to discharge home and verbalized understanding/was in agreement with this plan.    EKG Interpretation: Normal sinus rhythm at a rate of 71, , QRS 86, QTc 449 without evidence of STEMI or ischemia    Differential Diagnoses Considered: ACS, anxiety, musculoskeletal pain, pneumonia, migrainous headache, hypertension    Chronic Medical Conditions Significantly Affecting Care: None    External Records Reviewed: I reviewed recent and relevant outside records including: PCP notes, prior discharge summary, previous radiologic studies, HIE    Independent Interpretation of Studies:  I independently interpreted: Chest x-ray which revealed no acute cardiopulmonary process    Escalation of Care:  Appropriate for outpatient management with primary care provider follow-up and neurology  referral for long-term management and reevaluation.  Educated to change positions slowly and to maintain proper rest and hydration.  Educated on a healthy diet.  Return precautions discussed and patient verbalized understanding. All questions and concerns were addressed.    Social Determinants of Health Significantly Affecting Care:  None    Prescription Drug Consideration: Tylenol p.o., Reglan p.o. and Pepcid p.o. for symptomatic management and treatment at home.  Educated the portance of taking her antihypertensive as directed.  Educated that she may need her dose adjusted or a new medication added on due to her continued headaches and mildly elevated blood pressure despite being medication compliance.  She needs to have this done by her primary care provider and verbalized understanding.    Diagnostic testing considered: Considered CT angio versus MRI and LP scan regarding the finding on her CT head but as she is otherwise neurologically intact without any concerning findings on her assessment I do not believe this needs to be done in the emergency department at this time and can be followed up outpatient    Discussion of Management with Other Providers:   I discussed the patient/results with: none    *Please note that portions of this note may have been completed with a voice recognition program.  Efforts were made to edit the dictations but occasionally, words are mis-transcribed.      Procedure  Procedures     Mylene Silverman, MEAGHAN-CNP  08/13/24 [id number]

## 2024-08-14 ENCOUNTER — APPOINTMENT (OUTPATIENT)
Dept: NEUROLOGY | Facility: CLINIC | Age: 49
End: 2024-08-14
Payer: COMMERCIAL

## 2024-08-14 NOTE — TELEPHONE ENCOUNTER
Patient was seen again in the er again on 8/13 and she is asking if she can be seen sooner than 9/3 - she was in ER for headaches/blood pressure concerns. Please advise if OK to wait until 9/3 or if can be seen sooner? She was told to be seen by 8/16 - no openings with any providers at either office this week.

## 2024-08-19 ENCOUNTER — APPOINTMENT (OUTPATIENT)
Dept: RADIOLOGY | Facility: HOSPITAL | Age: 49
End: 2024-08-19
Payer: COMMERCIAL

## 2024-08-19 ENCOUNTER — HOSPITAL ENCOUNTER (EMERGENCY)
Facility: HOSPITAL | Age: 49
Discharge: HOME | End: 2024-08-19
Attending: EMERGENCY MEDICINE
Payer: COMMERCIAL

## 2024-08-19 VITALS
OXYGEN SATURATION: 98 % | SYSTOLIC BLOOD PRESSURE: 103 MMHG | RESPIRATION RATE: 15 BRPM | HEART RATE: 72 BPM | BODY MASS INDEX: 40.62 KG/M2 | DIASTOLIC BLOOD PRESSURE: 70 MMHG | TEMPERATURE: 98.6 F | WEIGHT: 229.28 LBS | HEIGHT: 63 IN

## 2024-08-19 DIAGNOSIS — R51.9 ACUTE NONINTRACTABLE HEADACHE, UNSPECIFIED HEADACHE TYPE: Primary | ICD-10-CM

## 2024-08-19 LAB
ALBUMIN SERPL BCP-MCNC: 4 G/DL (ref 3.4–5)
ALP SERPL-CCNC: 69 U/L (ref 33–110)
ALT SERPL W P-5'-P-CCNC: 13 U/L (ref 7–45)
ANION GAP SERPL CALC-SCNC: 11 MMOL/L (ref 10–20)
APPEARANCE CSF: CLEAR
APTT PPP: 40 SECONDS (ref 27–38)
AST SERPL W P-5'-P-CCNC: 15 U/L (ref 9–39)
BASOPHILS # BLD AUTO: 0.02 X10*3/UL (ref 0–0.1)
BASOPHILS NFR BLD AUTO: 0.3 %
BASOPHILS NFR CSF MANUAL: 0 %
BILIRUB SERPL-MCNC: 0.5 MG/DL (ref 0–1.2)
BLASTS CSF MANUAL: 0 %
BUN SERPL-MCNC: 11 MG/DL (ref 6–23)
CALCIUM SERPL-MCNC: 9.4 MG/DL (ref 8.6–10.6)
CHLORIDE SERPL-SCNC: 100 MMOL/L (ref 98–107)
CO2 SERPL-SCNC: 29 MMOL/L (ref 21–32)
COLOR CSF: COLORLESS
COLOR SPUN CSF: COLORLESS
CREAT SERPL-MCNC: 0.79 MG/DL (ref 0.5–1.05)
EGFRCR SERPLBLD CKD-EPI 2021: >90 ML/MIN/1.73M*2
EOSINOPHIL # BLD AUTO: 0.11 X10*3/UL (ref 0–0.7)
EOSINOPHIL NFR BLD AUTO: 1.5 %
EOSINOPHIL NFR CSF MANUAL: 0 %
ERYTHROCYTE [DISTWIDTH] IN BLOOD BY AUTOMATED COUNT: 13.1 % (ref 11.5–14.5)
GLUCOSE CSF-MCNC: 54 MG/DL (ref 40–70)
GLUCOSE SERPL-MCNC: 89 MG/DL (ref 74–99)
HCT VFR BLD AUTO: 38.7 % (ref 36–46)
HGB BLD-MCNC: 12.5 G/DL (ref 12–16)
HOLD SPECIMEN: NORMAL
HOLD SPECIMEN: NORMAL
IMM GRANULOCYTES # BLD AUTO: 0.01 X10*3/UL (ref 0–0.7)
IMM GRANULOCYTES NFR BLD AUTO: 0.1 % (ref 0–0.9)
IMM GRANULOCYTES NFR CSF: 0 %
INR PPP: 1 (ref 0.9–1.1)
LYMPHOCYTES # BLD AUTO: 2.15 X10*3/UL (ref 1.2–4.8)
LYMPHOCYTES NFR BLD AUTO: 28.6 %
LYMPHOCYTES NFR CSF MANUAL: 72 % (ref 28–96)
MAGNESIUM SERPL-MCNC: 1.96 MG/DL (ref 1.6–2.4)
MCH RBC QN AUTO: 28.8 PG (ref 26–34)
MCHC RBC AUTO-ENTMCNC: 32.3 G/DL (ref 32–36)
MCV RBC AUTO: 89 FL (ref 80–100)
MONOCYTES # BLD AUTO: 0.48 X10*3/UL (ref 0.1–1)
MONOCYTES NFR BLD AUTO: 6.4 %
MONOS+MACROS NFR CSF MANUAL: 25 % (ref 16–56)
NEUTROPHILS # BLD AUTO: 4.76 X10*3/UL (ref 1.2–7.7)
NEUTROPHILS NFR BLD AUTO: 63.1 %
NEUTS SEG NFR CSF MANUAL: 3 % (ref 0–5)
NRBC BLD-RTO: 0 /100 WBCS (ref 0–0)
OTHER CELLS NFR CSF MANUAL: 0 %
PLASMA CELLS NFR CSF MICRO: 0 %
PLATELET # BLD AUTO: 292 X10*3/UL (ref 150–450)
POTASSIUM SERPL-SCNC: 3.4 MMOL/L (ref 3.5–5.3)
PROT CSF-MCNC: 23 MG/DL (ref 15–45)
PROT SERPL-MCNC: 8 G/DL (ref 6.4–8.2)
PROTHROMBIN TIME: 11.7 SECONDS (ref 9.8–12.8)
RBC # BLD AUTO: 4.34 X10*6/UL (ref 4–5.2)
RBC # CSF AUTO: 20 /UL (ref 0–5)
SODIUM SERPL-SCNC: 137 MMOL/L (ref 136–145)
TOTAL CELLS COUNTED CSF: 36
TUBE # CSF: ABNORMAL
WBC # BLD AUTO: 7.5 X10*3/UL (ref 4.4–11.3)
WBC # CSF AUTO: 1 /UL (ref 1–5)

## 2024-08-19 PROCEDURE — 83735 ASSAY OF MAGNESIUM: CPT

## 2024-08-19 PROCEDURE — 89051 BODY FLUID CELL COUNT: CPT

## 2024-08-19 PROCEDURE — 82040 ASSAY OF SERUM ALBUMIN: CPT | Mod: 59

## 2024-08-19 PROCEDURE — 36415 COLL VENOUS BLD VENIPUNCTURE: CPT | Performed by: EMERGENCY MEDICINE

## 2024-08-19 PROCEDURE — 80053 COMPREHEN METABOLIC PANEL: CPT

## 2024-08-19 PROCEDURE — 2500000005 HC RX 250 GENERAL PHARMACY W/O HCPCS: Performed by: EMERGENCY MEDICINE

## 2024-08-19 PROCEDURE — 2500000004 HC RX 250 GENERAL PHARMACY W/ HCPCS (ALT 636 FOR OP/ED)

## 2024-08-19 PROCEDURE — 82945 GLUCOSE OTHER FLUID: CPT

## 2024-08-19 PROCEDURE — 62328 DX LMBR SPI PNXR W/FLUOR/CT: CPT

## 2024-08-19 PROCEDURE — 96360 HYDRATION IV INFUSION INIT: CPT

## 2024-08-19 PROCEDURE — 36415 COLL VENOUS BLD VENIPUNCTURE: CPT

## 2024-08-19 PROCEDURE — 99284 EMERGENCY DEPT VISIT MOD MDM: CPT | Mod: 25

## 2024-08-19 PROCEDURE — 84157 ASSAY OF PROTEIN OTHER: CPT

## 2024-08-19 PROCEDURE — 99285 EMERGENCY DEPT VISIT HI MDM: CPT | Mod: 25

## 2024-08-19 PROCEDURE — 85025 COMPLETE CBC W/AUTO DIFF WBC: CPT

## 2024-08-19 PROCEDURE — 85730 THROMBOPLASTIN TIME PARTIAL: CPT

## 2024-08-19 PROCEDURE — 85610 PROTHROMBIN TIME: CPT

## 2024-08-19 PROCEDURE — 99284 EMERGENCY DEPT VISIT MOD MDM: CPT | Performed by: EMERGENCY MEDICINE

## 2024-08-19 PROCEDURE — 87205 SMEAR GRAM STAIN: CPT

## 2024-08-19 PROCEDURE — 62270 DX LMBR SPI PNXR: CPT | Mod: 59

## 2024-08-19 RX ORDER — PANTOPRAZOLE SODIUM 40 MG/1
1 TABLET, DELAYED RELEASE ORAL DAILY
COMMUNITY
End: 2024-08-23 | Stop reason: WASHOUT

## 2024-08-19 RX ORDER — ACETAMINOPHEN 325 MG/1
975 TABLET ORAL ONCE
Status: COMPLETED | OUTPATIENT
Start: 2024-08-19 | End: 2024-08-19

## 2024-08-19 RX ORDER — TRIAMTERENE CAPSULES 100 MG/1
CAPSULE ORAL
COMMUNITY

## 2024-08-19 RX ORDER — MONTELUKAST SODIUM 10 MG/1
1 TABLET ORAL EVERY EVENING
COMMUNITY
End: 2024-08-23 | Stop reason: WASHOUT

## 2024-08-19 RX ORDER — LIDOCAINE HYDROCHLORIDE 10 MG/ML
10 INJECTION, SOLUTION EPIDURAL; INFILTRATION; INTRACAUDAL; PERINEURAL ONCE
Status: COMPLETED | OUTPATIENT
Start: 2024-08-19 | End: 2024-08-19

## 2024-08-19 ASSESSMENT — COLUMBIA-SUICIDE SEVERITY RATING SCALE - C-SSRS
1. IN THE PAST MONTH, HAVE YOU WISHED YOU WERE DEAD OR WISHED YOU COULD GO TO SLEEP AND NOT WAKE UP?: NO
6. HAVE YOU EVER DONE ANYTHING, STARTED TO DO ANYTHING, OR PREPARED TO DO ANYTHING TO END YOUR LIFE?: NO
2. HAVE YOU ACTUALLY HAD ANY THOUGHTS OF KILLING YOURSELF?: NO

## 2024-08-19 ASSESSMENT — PAIN DESCRIPTION - ONSET: ONSET: ONGOING

## 2024-08-19 ASSESSMENT — PAIN DESCRIPTION - DESCRIPTORS: DESCRIPTORS: ACHING

## 2024-08-19 ASSESSMENT — LIFESTYLE VARIABLES
HAVE PEOPLE ANNOYED YOU BY CRITICIZING YOUR DRINKING: NO
EVER HAD A DRINK FIRST THING IN THE MORNING TO STEADY YOUR NERVES TO GET RID OF A HANGOVER: NO
TOTAL SCORE: 0
EVER FELT BAD OR GUILTY ABOUT YOUR DRINKING: NO
HAVE YOU EVER FELT YOU SHOULD CUT DOWN ON YOUR DRINKING: NO

## 2024-08-19 ASSESSMENT — PAIN SCALES - GENERAL
PAINLEVEL_OUTOF10: 4
PAINLEVEL_OUTOF10: 3
PAINLEVEL_OUTOF10: 3

## 2024-08-19 ASSESSMENT — PAIN - FUNCTIONAL ASSESSMENT
PAIN_FUNCTIONAL_ASSESSMENT: 0-10
PAIN_FUNCTIONAL_ASSESSMENT: 0-10

## 2024-08-19 ASSESSMENT — PAIN DESCRIPTION - LOCATION: LOCATION: HEAD

## 2024-08-19 ASSESSMENT — PAIN DESCRIPTION - FREQUENCY: FREQUENCY: INTERMITTENT

## 2024-08-19 ASSESSMENT — PAIN DESCRIPTION - PAIN TYPE: TYPE: ACUTE PAIN

## 2024-08-19 NOTE — DISCHARGE INSTRUCTIONS
You presented to the ED for headache.  You were noted to undergo an LP which did not display an elevated opening pressure.  You were ordered neurology follow-up referral.  Follow-up with neurology as well as primary care within the next week.  Neurology clinic phone number is [phone].  Please return the emergency department for any new or worsening symptoms including but limited to concerning headache and changes in mental status.

## 2024-08-19 NOTE — ED PROCEDURE NOTE
Procedure  Lumbar Puncture    Performed by: Lucinda Saenz DO  Authorized by: Shorty Hollins MD    Consent:     Consent obtained:  Written    Consent given by:  Patient    Risks, benefits, and alternatives were discussed: yes      Risks discussed:  Bleeding, headache, nerve damage, infection, pain and repeat procedure    Alternatives discussed:  Observation  Pre-procedure details:     Procedure purpose:  Diagnostic    Preparation: Patient was prepped and draped in usual sterile fashion    Anesthesia:     Anesthesia method:  Local infiltration    Local anesthetic:  Lidocaine 1% w/o epi  Procedure details:     Lumbar space:  L4-L5 interspace    Patient position:  R lateral decubitus    Ultrasound guidance: no      Number of attempts:  4  Post-procedure details:     Procedure completion:  Procedure terminated electively by provider               Lucinda Saenz DO  Resident  08/19/24 7568

## 2024-08-19 NOTE — PROGRESS NOTES
Emergency Medicine Transition of Care Note.    I received Sheila Templeton in signout from Dr. Saenz.  Please see the previous ED provider note for all HPI, PE and MDM up to the time of signout at 1500. This is in addition to the primary record.    In brief Sheila Templeton is an 48 y.o. female with past medical history of GERD and asthma presenting with a chief complaint of headache and dizziness.  Patient underwent LP with IR with concern for possible pseudotumor cerebri.    Chief Complaint   Patient presents with    Headache    Weakness, Gen     At the time of signout we were awaiting: LP results    ED Course as of 08/21/24 1323   Mon Aug 19, 2024   1045 CBC and Auto Differential  No leukocytosis, normal hb and normal platelets at 292 [AW]   1200 Protime-INR  Normal INR [AW]   1457 Patient returned to the ED from IR after successful LP. Reported normal opening pressure. Pt reports her headache is a 3/10 and would like to try additional dose of tylenol.  [AW]      ED Course User Index  [AW] Lucinda Saenz, DO         Diagnoses as of 08/21/24 1323   Acute nonintractable headache, unspecified headache type       Medical Decision Making  Patient noted to have an opening pressure 19.  CSF results did not display infectious findings.  Upon reevaluation, patient noted significant proving of her headache and she feels ready to go home.  Patient ordered neurology referral and received neurology clinic phone number.  Discussed ED findings, plan for outpatient neurology follow-up, and strict return precautions for any new or worsening symptoms including but limited to concerning headache, changes in vision, and changes in hearing.  Patient stated understanding and agreement the plan.  All questions were answered.  Patient discharged in stable condition.    Final diagnoses:   [R51.9] Acute nonintractable headache, unspecified headache type           Procedure  Procedures    Patient presentation and plan discussed with  attending physician.    Iván Phillip MD

## 2024-08-19 NOTE — POST-PROCEDURE NOTE
Radiology Brief Postprocedure Note    Attending: Mahogany Braun MD    Assistant: Mario Moss MD and Neelima Ruff MD    Diagnosis: concern for pseudo tumor, requesting opening pressure    Description of procedure: The L3 vertebral space marked under fluoroscopy. Patient was prepped and draped in the usual sterile fashion. 9 ml 1% lidocaine injected for local anesthesia. Under direct fluoroscopic guidance, a lumber puncture was performed at the L3 interspace using a 20g spinal needle.  The opening pressure was measured at 16.5 cm H2O. A total of 13 ml of clear CSF was collected in 4 tubes. The collected CSF was then sent to the lab for appropriate lab tests as ordered by the referring physician. Hemostasis was achieved with direct pressure. The patient tolerated procedure well without any immediate or clinically apparent complications. See PACS for full procedural report.     Anesthesia:  Local    Complications: None    Estimated Blood Loss: minimal    Medications  As of 08/19/24 1455      lactated Ringer's bolus 1,000 mL (mL/hr) Total volume:  Not documented* Dosing weight:  104   *Total volume has not been documented. View each administration to see the amount administered.     Date/Time Rate/Dose/Volume Action       08/19/24  0944 1,000 mL - 999 mL/hr (over 60 min) New Bag      1044  (over 60 min) Stopped                     See detailed result report with images in PACS.    The patient tolerated the procedure well without incident or complication and is in stable condition.

## 2024-08-19 NOTE — ED PROVIDER NOTES
Emergency Department Provider Note        History of Present Illness     History provided by: Patient  Limitations to History: None  External Records Reviewed with Brief Summary: Radiology report of head CT from 8/13 which showed no hyperdense acute intracranial hemorrhage or infarct.  The ventricles, cisterns and sulci are on the lower limits of normal in size.  There is also CSF attenuation extending of the sellar region compatible with empty sella syndrome, possible incidental versus related to pseudotumor cerebri    HPI:  Sheila Templeton is a 48 y.o. female past medical history of GERD and asthma presenting with a chief complaint of headache, dizziness.  Patient describes this started with feeling like she was going to pass out this morning when she got up out of bed.  When she went to the restroom she actually to sit down on the floor because she felt like she was not can be able to ambulate.  Patient endorses a 4 out of 10 headache that affects mainly at the left side of the head, down into the neck and shoulder describes this more of a pressure-like sensation.  She also endorses feeling numb to bilateral V2 distribution.  She states that the dizziness is different than when she was here last but the headache is 4 out of 10.  She did take Tylenol prior to arrival without significant improvement.  She denies any recent flulike illnesses, she did feel nauseous yesterday but denies vomiting or diarrhea    Physical Exam   Triage vitals:  T 36.9 °C (98.5 °F)  HR 67  /85  RR 20  O2 97 % None (Room air)    General: Awake, alert, in no acute distress  Eyes: Gaze conjugate.  No scleral icterus or injection. EOMI without nystagmus. Pupils 3>2 equal round and reactive. Negative test of skew, negative head impulse test   HENT: Normo-cephalic, atraumatic. No stridor  CV: Regular rate, regular rhythm.   Resp: Breathing non-labored, speaking in full sentences.  Clear to auscultation bilaterally  MSK/Extremities: No  gross bony deformities. Moving all extremities  Skin: Warm. Appropriate color  Neuro: A&Ox3.  Normal speech pattern. PERRLA. EOMI. Visual fields intact bilaterally.  No facial droop.  Symmetric smile.  Decreased sensation to over V2 distribution bilaterally.  5/5 strength shoulder shrug.  5/5 strength in bilateral UE and LE.  Normal sensation to light touch in bilateral UE and LE. No dysmetria w/ finger-nose-finger or heel-shin bilaterally.  No dysdiadochocinesia w/ rapid alternating movements. Patient ambulatory without ataxia.  Psych: Appropriate mood and affect    Medical Decision Making & ED Course   Medical Decision Makin y.o. female past medical history of GERD and asthma who presents with a headache and dizziness starting this morning.  On arrival she is normotensive, afebrile, saturating well on room air and in no acute distress.  Her neurologic exam is reassuring with a negative hints exam.  I did review her previous imaging showing concern for pseudotumor cerebri.  I did discuss elective lumbar puncture here in the emergency department to help expedite treatment and care.  We discussed in length possible complications including bleeding, infection, hematoma, dry tap and patient elected to proceed with lumbar puncture.  Please see separate procedure note however unfortunately we were unsuccessful with obtaining CSF.  I did consult IR for an LP who have accepted the patient.  ----      Differential diagnoses considered include but are not limited to: Pseudotumor cerebri, dehydration, migraine, tension headache, meningitis, BPPV, orthostatic hypotension     Social Determinants of Health which Significantly Impact Care: None identified     EKG Independent Interpretation: EKG not obtained    Independent Result Review and Interpretation: Relevant laboratory and radiographic results were reviewed and independently interpreted by myself.  As necessary, they are commented on in the ED Course.    Chronic  conditions affecting the patient's care: As documented above in MDM    The patient was discussed with the following consultants/services:  IR for LP    Care Considerations: As documented above in MDM    ED Course:  ED Course as of 08/19/24 1333   Mon Aug 19, 2024   1045 CBC and Auto Differential  No leukocytosis, normal hb and normal platelets at 292 [AW]   1200 Protime-INR  Normal INR [AW]      ED Course User Index  [AW] Lucinda Saenz DO     Disposition   Patient was signed out to Dr. Phillip at 1500 pending completion of their work-up.  Please see the next provider's transition of care note for the remainder of the patient's care.     Procedures   Procedures    Patient seen and discussed with ED attending physician.    Lucinda Saenz DO  Emergency Medicine       Lucinda Saenz DO  Resident  08/19/24 4057

## 2024-08-19 NOTE — ED TRIAGE NOTES
"Pt to ED after having a sudden onset of HA and continued generalized weakness. Pt rates her HA at a 4/10. Pt just admitted and released with neuro follow up for exact issue. Diagnosed with \"excess fluid on the brain\". Neuro follow up is four months out. Pt also endorses continued generalized weakness. Weakness is not one sided and HA is not the worst of her life. Msps intact  "

## 2024-08-20 ENCOUNTER — APPOINTMENT (OUTPATIENT)
Dept: NEUROLOGY | Facility: CLINIC | Age: 49
End: 2024-08-20
Payer: COMMERCIAL

## 2024-08-21 LAB
ALB CSF/SERPL: 3.2 RATIO (ref 0–9)
ALBUMIN CSF-MCNC: 11 MG/DL (ref 0–35)
ALBUMIN SERPL-MCNC: 3461 MG/DL (ref 3500–5200)
IGG CSF-MCNC: 2.7 MG/DL (ref 0–6)
IGG SERPL-MCNC: 1636 MG/DL (ref 768–1632)
IGG SYNTH RATE SER+CSF CALC-MRATE: <0 MG/D
IGG/ALB CLEAR SER+CSF-RTO: 0.52 RATIO (ref 0.28–0.66)
IGG/ALB CSF: 0.25 RATIO (ref 0.09–0.25)
OLIGOCLONAL BANDS CSF ELPH-IMP: ABNORMAL
OLIGOCLONAL BANDS CSF ELPH-IMP: NEGATIVE
OLIGOCLONAL BANDS CSF IEF: ABNORMAL BANDS (ref 0–1)

## 2024-08-22 LAB
BACTERIA CSF CULT: NORMAL
GRAM STN SPEC: NORMAL
GRAM STN SPEC: NORMAL

## 2024-08-23 ENCOUNTER — OFFICE VISIT (OUTPATIENT)
Dept: PRIMARY CARE | Facility: CLINIC | Age: 49
End: 2024-08-23
Payer: COMMERCIAL

## 2024-08-23 VITALS
DIASTOLIC BLOOD PRESSURE: 78 MMHG | WEIGHT: 239 LBS | SYSTOLIC BLOOD PRESSURE: 120 MMHG | TEMPERATURE: 97.4 F | HEART RATE: 86 BPM | BODY MASS INDEX: 42.34 KG/M2 | OXYGEN SATURATION: 100 %

## 2024-08-23 DIAGNOSIS — G89.29 CHRONIC INTRACTABLE HEADACHE, UNSPECIFIED HEADACHE TYPE: Primary | ICD-10-CM

## 2024-08-23 DIAGNOSIS — R51.9 CHRONIC INTRACTABLE HEADACHE, UNSPECIFIED HEADACHE TYPE: Primary | ICD-10-CM

## 2024-08-23 DIAGNOSIS — E23.6 EMPTY SELLA (MULTI): ICD-10-CM

## 2024-08-23 PROCEDURE — 3074F SYST BP LT 130 MM HG: CPT | Performed by: INTERNAL MEDICINE

## 2024-08-23 PROCEDURE — 99214 OFFICE O/P EST MOD 30 MIN: CPT | Performed by: INTERNAL MEDICINE

## 2024-08-23 PROCEDURE — 3078F DIAST BP <80 MM HG: CPT | Performed by: INTERNAL MEDICINE

## 2024-08-23 ASSESSMENT — PATIENT HEALTH QUESTIONNAIRE - PHQ9
2. FEELING DOWN, DEPRESSED OR HOPELESS: NOT AT ALL
SUM OF ALL RESPONSES TO PHQ9 QUESTIONS 1 AND 2: 0
1. LITTLE INTEREST OR PLEASURE IN DOING THINGS: NOT AT ALL

## 2024-08-23 ASSESSMENT — PAIN SCALES - GENERAL: PAINLEVEL: 0-NO PAIN

## 2024-08-23 NOTE — LETTER
August 23, 2024     Patient: Sheila Templeton   YOB: 1975   Date of Visit: 8/23/2024       To Whom It May Concern:    Sheila Templeton was seen in my clinic on 8/23/2024 at 3:30 pm. Please excuse Sheila for her absence from work on this day to make the appointment. Please excuse absences 8/19-8/26.    If you have any questions or concerns, please don't hesitate to call.         Sincerely,         Hui Javier MD        CC: No Recipients

## 2024-08-25 ASSESSMENT — ENCOUNTER SYMPTOMS
FEVER: 0
HEADACHES: 1
NUMBNESS: 0
WEAKNESS: 0
VOMITING: 0
BLURRED VISION: 1
NAUSEA: 1
PHOTOPHOBIA: 1
TINGLING: 0
DIZZINESS: 1

## 2024-08-25 NOTE — PROGRESS NOTES
Subjective   Patient ID: Sheila Templeton is a 48 y.o. female who presents for Headache.    Pt's HA have triggered ER visits x 3. She had a brain MRI that showed an empty sella. She had a lumbar puncture, but opening pressure could not be adequately assessed due to pt's position during LP. She is having difficulty scheduling an urgent appt with [facility].    Headache   This is a new problem. The current episode started 1 to 4 weeks ago. The problem occurs constantly. The problem has been waxing and waning. The pain does not radiate. The pain quality is not similar to prior headaches. The quality of the pain is described as throbbing. Associated symptoms include blurred vision, dizziness, nausea, phonophobia and photophobia. Pertinent negatives include no fever, numbness, tingling, vomiting or weakness. Nothing aggravates the symptoms. She has tried acetaminophen and NSAIDs for the symptoms. The treatment provided no relief. Her past medical history is significant for obesity. There is no history of recent head traumas.        Review of Systems   Constitutional:  Negative for fever.   Eyes:  Positive for blurred vision and photophobia.   Gastrointestinal:  Positive for nausea. Negative for vomiting.   Neurological:  Positive for dizziness and headaches. Negative for tingling, weakness and numbness.       Objective   /78   Pulse 86   Temp 36.3 °C (97.4 °F)   Wt 108 kg (239 lb)   SpO2 100%   BMI 42.34 kg/m²     Physical Exam  Vitals reviewed.   Constitutional:       General: She is not in acute distress.     Appearance: She is ill-appearing. She is not toxic-appearing.   HENT:      Nose: No congestion.   Eyes:      Extraocular Movements: Extraocular movements intact.      Pupils: Pupils are equal, round, and reactive to light.   Cardiovascular:      Rate and Rhythm: Normal rate and regular rhythm.      Heart sounds: Normal heart sounds.   Pulmonary:      Effort: Pulmonary effort is normal.      Breath sounds:  Normal breath sounds.   Skin:     General: Skin is warm and dry.   Neurological:      General: No focal deficit present.      Mental Status: She is alert and oriented to person, place, and time.   Psychiatric:         Mood and Affect: Mood normal.         Assessment/Plan   Diagnoses and all orders for this visit:  Chronic intractable headache, unspecified headache type  Comments:  ER records reviewed. Appt secured with Dr. Ladonna ESCOTO, on 8/27.  Orders:  -     Referral to Neurology; Future  Empty sella (Multi)  -     Referral to Neurology; Future

## 2024-08-27 ENCOUNTER — HOSPITAL ENCOUNTER (OUTPATIENT)
Dept: RADIOLOGY | Facility: CLINIC | Age: 49
Discharge: HOME | End: 2024-08-27
Payer: COMMERCIAL

## 2024-08-27 ENCOUNTER — TELEPHONE (OUTPATIENT)
Dept: OPHTHALMOLOGY | Facility: CLINIC | Age: 49
End: 2024-08-27
Payer: COMMERCIAL

## 2024-08-27 DIAGNOSIS — G44.211 EPISODIC TENSION-TYPE HEADACHE, INTRACTABLE: ICD-10-CM

## 2024-08-27 PROCEDURE — 70498 CT ANGIOGRAPHY NECK: CPT

## 2024-08-27 PROCEDURE — 2550000001 HC RX 255 CONTRASTS: Performed by: PSYCHIATRY & NEUROLOGY

## 2024-08-27 NOTE — TELEPHONE ENCOUNTER
REFERRAL RECEIVED FROM NEURO FOR HEADACHE EVAL. PT HAS FUTURE APPT SCHEDULED WITH DR. STRONG. WILL DISCUSS WITH PT IF SHE NEEDS TO SCHEDULE APPT IN OUR OFFICE AS WELL-INES ESCOTO WANTED PT TO BE SEEN PRIOR TO FULL EYE EXAM APPT SCHEDULED FOR DILATION. NEURO AND ED NOTES TO CLD FOR SCHEDULING-INES 08/27/24

## 2024-08-28 LAB
BACTERIA CSF CULT: NORMAL
GRAM STN SPEC: NORMAL
GRAM STN SPEC: NORMAL

## 2024-08-29 ENCOUNTER — E-VISIT (OUTPATIENT)
Dept: PRIMARY CARE | Facility: CLINIC | Age: 49
End: 2024-08-29
Payer: COMMERCIAL

## 2024-08-29 NOTE — TELEPHONE ENCOUNTER
If pt feels well enough to return to work, she can. If not, I can return her after she sees the specialist on 9/10.

## 2024-09-02 ENCOUNTER — MULTIDISCIPLINARY MEETING (OUTPATIENT)
Dept: NEUROSURGERY | Facility: HOSPITAL | Age: 49
End: 2024-09-02
Payer: COMMERCIAL

## 2024-09-03 ENCOUNTER — APPOINTMENT (OUTPATIENT)
Dept: RADIOLOGY | Facility: HOSPITAL | Age: 49
End: 2024-09-03
Payer: COMMERCIAL

## 2024-09-03 ENCOUNTER — HOSPITAL ENCOUNTER (EMERGENCY)
Facility: HOSPITAL | Age: 49
Discharge: HOME | End: 2024-09-03
Attending: EMERGENCY MEDICINE
Payer: COMMERCIAL

## 2024-09-03 ENCOUNTER — APPOINTMENT (OUTPATIENT)
Dept: PRIMARY CARE | Facility: CLINIC | Age: 49
End: 2024-09-03
Payer: COMMERCIAL

## 2024-09-03 VITALS
SYSTOLIC BLOOD PRESSURE: 128 MMHG | OXYGEN SATURATION: 100 % | WEIGHT: 240 LBS | BODY MASS INDEX: 42.52 KG/M2 | HEIGHT: 63 IN | RESPIRATION RATE: 18 BRPM | TEMPERATURE: 98.6 F | DIASTOLIC BLOOD PRESSURE: 86 MMHG | HEART RATE: 86 BPM

## 2024-09-03 DIAGNOSIS — R51.9 ACUTE INTRACTABLE HEADACHE, UNSPECIFIED HEADACHE TYPE: Primary | ICD-10-CM

## 2024-09-03 LAB
ABO GROUP (TYPE) IN BLOOD: NORMAL
ALBUMIN SERPL BCP-MCNC: 4.1 G/DL (ref 3.4–5)
ALP SERPL-CCNC: 75 U/L (ref 33–110)
ALT SERPL W P-5'-P-CCNC: 15 U/L (ref 7–45)
ANION GAP SERPL CALC-SCNC: 20 MMOL/L (ref 10–20)
ANTIBODY SCREEN: NORMAL
APPEARANCE UR: CLEAR
APTT PPP: 24 SECONDS (ref 27–38)
AST SERPL W P-5'-P-CCNC: 16 U/L (ref 9–39)
BASOPHILS # BLD AUTO: 0.03 X10*3/UL (ref 0–0.1)
BASOPHILS NFR BLD AUTO: 0.3 %
BILIRUB SERPL-MCNC: 0.5 MG/DL (ref 0–1.2)
BILIRUB UR STRIP.AUTO-MCNC: NEGATIVE MG/DL
BUN SERPL-MCNC: 10 MG/DL (ref 6–23)
CALCIUM SERPL-MCNC: 9.6 MG/DL (ref 8.6–10.6)
CHLORIDE SERPL-SCNC: 98 MMOL/L (ref 98–107)
CO2 SERPL-SCNC: 21 MMOL/L (ref 21–32)
COLOR UR: COLORLESS
CREAT SERPL-MCNC: 0.83 MG/DL (ref 0.5–1.05)
EGFRCR SERPLBLD CKD-EPI 2021: 88 ML/MIN/1.73M*2
EOSINOPHIL # BLD AUTO: 0.07 X10*3/UL (ref 0–0.7)
EOSINOPHIL NFR BLD AUTO: 0.7 %
ERYTHROCYTE [DISTWIDTH] IN BLOOD BY AUTOMATED COUNT: 13.2 % (ref 11.5–14.5)
GLUCOSE SERPL-MCNC: 118 MG/DL (ref 74–99)
GLUCOSE UR STRIP.AUTO-MCNC: NORMAL MG/DL
HCT VFR BLD AUTO: 39.4 % (ref 36–46)
HGB BLD-MCNC: 13.4 G/DL (ref 12–16)
IMM GRANULOCYTES # BLD AUTO: 0.03 X10*3/UL (ref 0–0.7)
IMM GRANULOCYTES NFR BLD AUTO: 0.3 % (ref 0–0.9)
INR PPP: 0.9 (ref 0.9–1.1)
KETONES UR STRIP.AUTO-MCNC: NEGATIVE MG/DL
LEUKOCYTE ESTERASE UR QL STRIP.AUTO: ABNORMAL
LYMPHOCYTES # BLD AUTO: 4.77 X10*3/UL (ref 1.2–4.8)
LYMPHOCYTES NFR BLD AUTO: 44.4 %
MCH RBC QN AUTO: 29.1 PG (ref 26–34)
MCHC RBC AUTO-ENTMCNC: 34 G/DL (ref 32–36)
MCV RBC AUTO: 86 FL (ref 80–100)
MONOCYTES # BLD AUTO: 0.51 X10*3/UL (ref 0.1–1)
MONOCYTES NFR BLD AUTO: 4.7 %
NEUTROPHILS # BLD AUTO: 5.33 X10*3/UL (ref 1.2–7.7)
NEUTROPHILS NFR BLD AUTO: 49.6 %
NITRITE UR QL STRIP.AUTO: NEGATIVE
NRBC BLD-RTO: 0 /100 WBCS (ref 0–0)
PH UR STRIP.AUTO: 7.5 [PH]
PLATELET # BLD AUTO: 160 X10*3/UL (ref 150–450)
POTASSIUM SERPL-SCNC: 3.1 MMOL/L (ref 3.5–5.3)
PROT SERPL-MCNC: 7.9 G/DL (ref 6.4–8.2)
PROT UR STRIP.AUTO-MCNC: NEGATIVE MG/DL
PROTHROMBIN TIME: 10.5 SECONDS (ref 9.8–12.8)
RBC # BLD AUTO: 4.61 X10*6/UL (ref 4–5.2)
RBC # UR STRIP.AUTO: NEGATIVE /UL
RBC #/AREA URNS AUTO: NORMAL /HPF
RH FACTOR (ANTIGEN D): NORMAL
SODIUM SERPL-SCNC: 136 MMOL/L (ref 136–145)
SP GR UR STRIP.AUTO: 1.01
SQUAMOUS #/AREA URNS AUTO: NORMAL /HPF
UROBILINOGEN UR STRIP.AUTO-MCNC: NORMAL MG/DL
WBC # BLD AUTO: 10.7 X10*3/UL (ref 4.4–11.3)
WBC #/AREA URNS AUTO: NORMAL /HPF

## 2024-09-03 PROCEDURE — 87086 URINE CULTURE/COLONY COUNT: CPT

## 2024-09-03 PROCEDURE — 96374 THER/PROPH/DIAG INJ IV PUSH: CPT

## 2024-09-03 PROCEDURE — 86901 BLOOD TYPING SEROLOGIC RH(D): CPT

## 2024-09-03 PROCEDURE — 99284 EMERGENCY DEPT VISIT MOD MDM: CPT

## 2024-09-03 PROCEDURE — 70450 CT HEAD/BRAIN W/O DYE: CPT | Performed by: RADIOLOGY

## 2024-09-03 PROCEDURE — 81001 URINALYSIS AUTO W/SCOPE: CPT

## 2024-09-03 PROCEDURE — 82565 ASSAY OF CREATININE: CPT

## 2024-09-03 PROCEDURE — 71046 X-RAY EXAM CHEST 2 VIEWS: CPT | Performed by: RADIOLOGY

## 2024-09-03 PROCEDURE — 71046 X-RAY EXAM CHEST 2 VIEWS: CPT

## 2024-09-03 PROCEDURE — 36415 COLL VENOUS BLD VENIPUNCTURE: CPT

## 2024-09-03 PROCEDURE — 2500000004 HC RX 250 GENERAL PHARMACY W/ HCPCS (ALT 636 FOR OP/ED)

## 2024-09-03 PROCEDURE — 2500000002 HC RX 250 W HCPCS SELF ADMINISTERED DRUGS (ALT 637 FOR MEDICARE OP, ALT 636 FOR OP/ED)

## 2024-09-03 PROCEDURE — 85610 PROTHROMBIN TIME: CPT

## 2024-09-03 PROCEDURE — 85025 COMPLETE CBC W/AUTO DIFF WBC: CPT

## 2024-09-03 PROCEDURE — 96375 TX/PRO/DX INJ NEW DRUG ADDON: CPT

## 2024-09-03 PROCEDURE — 70450 CT HEAD/BRAIN W/O DYE: CPT

## 2024-09-03 RX ORDER — ACETAMINOPHEN 325 MG/1
975 TABLET ORAL ONCE
Status: DISCONTINUED | OUTPATIENT
Start: 2024-09-03 | End: 2024-09-03

## 2024-09-03 RX ORDER — POTASSIUM CHLORIDE 20 MEQ/1
40 TABLET, EXTENDED RELEASE ORAL ONCE
Status: COMPLETED | OUTPATIENT
Start: 2024-09-03 | End: 2024-09-03

## 2024-09-03 RX ORDER — PROCHLORPERAZINE EDISYLATE 5 MG/ML
10 INJECTION INTRAMUSCULAR; INTRAVENOUS ONCE
Status: COMPLETED | OUTPATIENT
Start: 2024-09-03 | End: 2024-09-03

## 2024-09-03 RX ORDER — DIPHENHYDRAMINE HYDROCHLORIDE 50 MG/ML
25 INJECTION INTRAMUSCULAR; INTRAVENOUS ONCE
Status: COMPLETED | OUTPATIENT
Start: 2024-09-03 | End: 2024-09-03

## 2024-09-03 ASSESSMENT — PAIN DESCRIPTION - LOCATION: LOCATION: HEAD

## 2024-09-03 ASSESSMENT — PAIN DESCRIPTION - PAIN TYPE: TYPE: ACUTE PAIN

## 2024-09-03 ASSESSMENT — PAIN SCALES - GENERAL
PAINLEVEL_OUTOF10: 8
PAINLEVEL_OUTOF10: 7

## 2024-09-03 ASSESSMENT — PAIN - FUNCTIONAL ASSESSMENT: PAIN_FUNCTIONAL_ASSESSMENT: 0-10

## 2024-09-03 NOTE — DISCHARGE INSTRUCTIONS
You have been evaluated in the Emergency Department today for headache. Your evaluation did not show evidence of medical conditions requiring emergent intervention at this time, and your pain improved with medication in the ED.     We recommend you take 600mg ibuprofen every 6 hours or tylenol 650mg every 6 hours as needed for pain. If needed, you can alternate these medications so that you take one medication every 3 hours. For instance, at noon take ibuprofen, then at 3pm take tylenol, then at 6pm take ibuprofen.    Please follow up with your neurosurgeon within two days.    Return to the Emergency Department if you experience worsening or uncontrolled pain, vision changes, recurrent vomiting, difficulty with normal activities, abnormal behavior, difficulty walking, numbness, weakness, or any other concerning symptoms.

## 2024-09-03 NOTE — ED TRIAGE NOTES
47 yo F  In via EMS for headache  Patient has excessive cerebral spinal fluid  Has appt on Thursday   Woke up with head throbbing 10/10  Blurred vision, numbness and tingling throughout arms, legs, and chest  ALERT AND ORIENTED

## 2024-09-03 NOTE — ED PROVIDER NOTES
Emergency Department Provider Note        History of Present Illness     History provided by: Patient  Limitations to History: None  External Records Reviewed with Brief Summary:  ED note from 8/19/2024 indicating patient was seen for severe headache, neck and shoulder pain, and numbness and tingling. Workup included LP that had a normal pressure of 16.5, with negative CSF culture and negative for a full oligoclonal banding.       HPI:  Sheila Templteon is a 48 y.o. female with past medical history of hypertension, presenting to the ED with severe headache, neck pain and numbness and tingling in bilateral upper extremities.  Patient notes her symptoms started last night, however the numbness and tingling has been persistent for over the past few weeks.  Patient notes that the pain is a 10/10.  Denies any recent fevers, chills.  No chest pain or shortness of breath.  No urinary symptoms or abdominal pain.  Patient notes she has an appoint with neurology on Thursday of this week.  Patient was seen on 8/19 for workup, she notes she has not felt at her baseline since this time. No fever, chills, chest pain, shortness of breath. No abdominal pain or urinary symptoms.     Physical Exam   Triage vitals:  T (!) 39 °C (102.2 °F)  HR (!) 142  BP (!) 135/114  RR (!) 45  O2 (!) 87 % None (Room air)  Repeat vitals 15 minutes later: T: 37 (98.6) HR: 106  BP: 115/79  RR: 25  O2: 100% on 4 L     General: Awake, alert, in mild distress due to pain   Eyes: Gaze conjugate.  No scleral icterus or injection  HENT: Normo-cephalic, atraumatic. No stridor  CV: Tachycardic rate, regular rhythm. Radial pulses 2+ bilaterally  Resp: Breathing non-labored, speaking in full sentences.  Clear to auscultation bilaterally  GI: Soft, non-distended, non-tender. No rebound or guarding.  MSK/Extremities: No gross bony deformities. Moving all extremities  Skin: Warm. Appropriate color  Neuro: Alert. Oriented. Face symmetric. Speech is fluent.  CN  II-XII intact. Strength 5/5 in bilateral upper and lower extremities. sensation intact in b/l UE and LEs  Psych: Appropriate mood and affect    Medical Decision Making & ED Course   Medical Decision Makin y.o. female with past medical history as noted above, presenting to the ED with severe headache and neck pain. On arrival patient was in significant distress, tachycardic with O2 saturations in the high 80s.  Patient was started on 4 L nasal cannula O2 with improvement to 100%.  Once she calmed down, her heart rate decreased to 105. Temp on arrival was not accurate, repeat 98.6. Physical exam unremarkable, neurologically intact.  Cranial nerves II through XII intact.  Strength 5 out of 5 in bilateral upper and lower extremities.  Differentials including subarachnoid hemorrhage, with sudden onset and headache , will obtain CT to rule this out.  Less likely has it feels similar to previous headache. Significant neck pain this concern for early meningitis, the plan for now is to rule out intracranial bleeding and proceed with LP. Will treat headache with compazine and benadryl.     CT head stable, no evidence of acute changes. Patient found to be hypokalemic, repletion 40 meQ. Upon reevaluation post headache cocktail, patient feeling significantly improved. She would like to go home   At this point, I do not think that it is necessary to repeat LP, as patients symptoms have improved to baseline, and CT showed no acute changes. I think it is reasonable for patient to go to her neurosurgery appointment on Thursday. Discussed return precautions in significant detail with patient, she verbally agreed. Patient discharged in stable condition.     ----    Differential diagnoses considered include but are not limited to: Increased ICP, early meningitis, pseudotumor cerebri, SAH     Social Determinants of Health which Significantly Impact Care: None identified     EKG Independent Interpretation: The EKG obtained at 1209  was independently interpreted by myself. It demonstrates normal sinus rhythm with a ventricular rate of 114. Normal axis. Intervals normal. ST segments showed no elevation or other signs of ischemia.  No significant changes when compared to prior EKG from 8/13/24    Independent Result Review and Interpretation: Relevant laboratory and radiographic results were reviewed and independently interpreted by myself.  As necessary, they are commented on in the ED Course.    Chronic conditions affecting the patient's care: As documented above in [facility]    The patient was discussed with the following consultants/services: None    Care Considerations: As documented above in [facility]    ED Course:  ED Course as of 09/05/24 1419   Tue Sep 03, 2024   1130 XR chest 2 views  No acute infectious process  [NM]   1200 CBC and Auto Differential  No leukocytosis  [NM]   1400 Urinalysis with Reflex Culture and Microscopic(!)  No evidence of UTI [NM]   1417 Patient weened off O2, satting 100% on RA.  [NM]      ED Course User Index  [NM] Sheila Powell DO         Diagnoses as of 09/05/24 1419   Acute intractable headache, unspecified headache type     Disposition   As a result of the work-up, the patient was discharged home.  she was informed of her diagnosis and instructed to come back with any concerns or worsening of condition.  she and was agreeable to the plan as discussed above.  she was given the opportunity to ask questions.  All of the patient's questions were answered.    Procedures   Procedures    This was a shared visit with an ED attending.  The patient was seen and discussed with the ED attending    Sheila Powell DO  Emergency Medicine     Sheila Powell DO  Resident  09/05/24 1421

## 2024-09-04 LAB
BACTERIA UR CULT: NORMAL
HOLD SPECIMEN: NORMAL

## 2024-09-05 ENCOUNTER — APPOINTMENT (OUTPATIENT)
Dept: NEUROSURGERY | Facility: CLINIC | Age: 49
End: 2024-09-05
Payer: COMMERCIAL

## 2024-09-09 ENCOUNTER — TELEPHONE (OUTPATIENT)
Dept: PRIMARY CARE | Facility: CLINIC | Age: 49
End: 2024-09-09
Payer: COMMERCIAL

## 2024-09-11 NOTE — PROGRESS NOTES
Cerebrovascular Conference 09/02/2024    Case Review: PMH GERD, asthma, p/w HA, dizzinesss, c/f pseudotumor, LP OP 19, presents with recurrent HA's, 8/19 LP OP 16.5, imaging with empy sella, has neurology appointment on 11/20. CTA head with no evidence for basilar infundibulum or aneurysm.         Recommendations:  Neurosurgical evaluation not required. No additional vascular imaging recommended.       Cerebrovascular conference is a multidisciplinary conferences attended by neurosurgery, neuro-radiology, APPs, and Rns.

## 2024-09-13 ENCOUNTER — HOSPITAL ENCOUNTER (OUTPATIENT)
Dept: RADIOLOGY | Facility: CLINIC | Age: 49
Discharge: HOME | End: 2024-09-13
Payer: COMMERCIAL

## 2024-09-13 DIAGNOSIS — G44.211 EPISODIC TENSION-TYPE HEADACHE, INTRACTABLE: ICD-10-CM

## 2024-09-16 ENCOUNTER — APPOINTMENT (OUTPATIENT)
Dept: NEUROLOGY | Facility: CLINIC | Age: 49
End: 2024-09-16
Payer: COMMERCIAL

## 2024-09-18 ENCOUNTER — OFFICE VISIT (OUTPATIENT)
Dept: PRIMARY CARE | Facility: CLINIC | Age: 49
End: 2024-09-18
Payer: COMMERCIAL

## 2024-09-18 VITALS
HEART RATE: 106 BPM | OXYGEN SATURATION: 100 % | BODY MASS INDEX: 42.16 KG/M2 | WEIGHT: 238 LBS | TEMPERATURE: 97.4 F | DIASTOLIC BLOOD PRESSURE: 80 MMHG | SYSTOLIC BLOOD PRESSURE: 126 MMHG

## 2024-09-18 DIAGNOSIS — R51.9 CHRONIC INTRACTABLE HEADACHE, UNSPECIFIED HEADACHE TYPE: Primary | ICD-10-CM

## 2024-09-18 DIAGNOSIS — E53.8 VITAMIN B12 DEFICIENCY: ICD-10-CM

## 2024-09-18 DIAGNOSIS — E23.6 EMPTY SELLA (MULTI): ICD-10-CM

## 2024-09-18 DIAGNOSIS — G89.29 CHRONIC INTRACTABLE HEADACHE, UNSPECIFIED HEADACHE TYPE: Primary | ICD-10-CM

## 2024-09-18 PROCEDURE — 1036F TOBACCO NON-USER: CPT | Performed by: INTERNAL MEDICINE

## 2024-09-18 PROCEDURE — 2500000004 HC RX 250 GENERAL PHARMACY W/ HCPCS (ALT 636 FOR OP/ED): Performed by: INTERNAL MEDICINE

## 2024-09-18 PROCEDURE — 3074F SYST BP LT 130 MM HG: CPT | Performed by: INTERNAL MEDICINE

## 2024-09-18 PROCEDURE — 3079F DIAST BP 80-89 MM HG: CPT | Performed by: INTERNAL MEDICINE

## 2024-09-18 PROCEDURE — 99213 OFFICE O/P EST LOW 20 MIN: CPT | Performed by: INTERNAL MEDICINE

## 2024-09-18 PROCEDURE — 96372 THER/PROPH/DIAG INJ SC/IM: CPT | Performed by: INTERNAL MEDICINE

## 2024-09-18 RX ORDER — CYCLOBENZAPRINE HCL 10 MG
10 TABLET ORAL 3 TIMES DAILY PRN
Qty: 30 TABLET | Refills: 3 | Status: SHIPPED | OUTPATIENT
Start: 2024-09-18 | End: 2024-11-17

## 2024-09-18 RX ORDER — CYANOCOBALAMIN 1000 UG/ML
1000 INJECTION, SOLUTION INTRAMUSCULAR; SUBCUTANEOUS ONCE
Status: COMPLETED | OUTPATIENT
Start: 2024-09-18 | End: 2024-09-18

## 2024-09-18 ASSESSMENT — ENCOUNTER SYMPTOMS
PHOTOPHOBIA: 1
APPETITE CHANGE: 0
CHILLS: 0
SHORTNESS OF BREATH: 0
VOMITING: 0
WEAKNESS: 0
DIZZINESS: 0
NAUSEA: 1
HEADACHES: 1
PALPITATIONS: 0
FEVER: 0
NUMBNESS: 0
ACTIVITY CHANGE: 0

## 2024-09-18 ASSESSMENT — PAIN SCALES - GENERAL: PAINLEVEL: 0-NO PAIN

## 2024-09-18 ASSESSMENT — PATIENT HEALTH QUESTIONNAIRE - PHQ9
SUM OF ALL RESPONSES TO PHQ9 QUESTIONS 1 AND 2: 0
2. FEELING DOWN, DEPRESSED OR HOPELESS: NOT AT ALL
1. LITTLE INTEREST OR PLEASURE IN DOING THINGS: NOT AT ALL

## 2024-09-18 NOTE — PROGRESS NOTES
Subjective   Patient ID: Sheila Templeton is a 48 y.o. female who presents for F/U NEURO.    This is a 48 y.o. presenting for follow up of HA. She has been evaluated by NEURO and has an MRI/MRV scheduled for tomorrow. She also has an appt in HA clinic tomorrow. She has been provided samples of Nurtec by NEURO and states that have her HA have improved. She has already been evaluated by [facility]. Pt has not been able to return to work since 8/19.  Pt also was found to have low normal Vit B12 levels. She is requesting Vit B12 injections.          Review of Systems   Constitutional:  Negative for activity change, appetite change, chills and fever.   Eyes:  Positive for photophobia and visual disturbance.   Respiratory:  Negative for shortness of breath.    Cardiovascular:  Negative for palpitations.   Gastrointestinal:  Positive for nausea. Negative for vomiting.   Neurological:  Positive for headaches. Negative for dizziness, weakness and numbness.       Objective   /80   Pulse 106   Temp 36.3 °C (97.4 °F)   Wt 108 kg (238 lb)   SpO2 100%   BMI 42.16 kg/m²     Physical Exam  Vitals reviewed.   Constitutional:       General: She is not in acute distress.     Appearance: Normal appearance. She is obese. She is not ill-appearing.   Eyes:      Extraocular Movements: Extraocular movements intact.      Pupils: Pupils are equal, round, and reactive to light.   Cardiovascular:      Rate and Rhythm: Regular rhythm. Tachycardia present.      Heart sounds: Normal heart sounds.   Pulmonary:      Effort: Pulmonary effort is normal.      Breath sounds: Normal breath sounds.   Neurological:      General: No focal deficit present.      Mental Status: She is alert and oriented to person, place, and time.   Psychiatric:         Mood and Affect: Mood normal.         Assessment/Plan   Diagnoses and all orders for this visit:  Chronic intractable headache, unspecified headache type  Comments:  Pt  will tentatively return to work on  9/24  Orders:  -     cyclobenzaprine (Flexeril) 10 mg tablet; Take 1 tablet (10 mg) by mouth 3 times a day as needed for muscle spasms.  -     rimegepant (NURTEC) 75 mg tablet,disintegrating; Take 1 tablet (75 mg) by mouth every other day.  Empty sella (Multi)  Vitamin B12 deficiency  -     cyanocobalamin (Vitamin B-12) injection 1,000 mcg  Other orders  -     Follow Up In Primary Care - Nurse Visit; Future

## 2024-09-19 ENCOUNTER — HOSPITAL ENCOUNTER (OUTPATIENT)
Dept: RADIOLOGY | Facility: CLINIC | Age: 49
Discharge: HOME | End: 2024-09-19
Payer: COMMERCIAL

## 2024-09-19 PROCEDURE — 70544 MR ANGIOGRAPHY HEAD W/O DYE: CPT

## 2024-09-19 PROCEDURE — 70551 MRI BRAIN STEM W/O DYE: CPT | Mod: 52

## 2024-09-26 ENCOUNTER — APPOINTMENT (OUTPATIENT)
Dept: NEUROSURGERY | Facility: CLINIC | Age: 49
End: 2024-09-26
Payer: COMMERCIAL

## 2024-10-01 ENCOUNTER — APPOINTMENT (OUTPATIENT)
Dept: OPHTHALMOLOGY | Facility: CLINIC | Age: 49
End: 2024-10-01
Payer: COMMERCIAL

## 2024-10-12 ENCOUNTER — APPOINTMENT (OUTPATIENT)
Dept: OPHTHALMOLOGY | Facility: CLINIC | Age: 49
End: 2024-10-12
Payer: COMMERCIAL

## 2024-10-18 DIAGNOSIS — E53.8 VITAMIN B12 DEFICIENCY: ICD-10-CM

## 2024-10-18 RX ORDER — CYANOCOBALAMIN 1000 UG/ML
1000 INJECTION, SOLUTION INTRAMUSCULAR; SUBCUTANEOUS ONCE
Status: COMPLETED | OUTPATIENT
Start: 2024-10-21 | End: 2024-10-21

## 2024-10-20 DIAGNOSIS — Z76.0 MEDICATION REFILL: ICD-10-CM

## 2024-10-20 DIAGNOSIS — I10 ESSENTIAL HYPERTENSION: ICD-10-CM

## 2024-10-21 ENCOUNTER — CLINICAL SUPPORT (OUTPATIENT)
Dept: PRIMARY CARE | Facility: CLINIC | Age: 49
End: 2024-10-21
Payer: COMMERCIAL

## 2024-10-21 DIAGNOSIS — E53.8 VITAMIN B12 DEFICIENCY: ICD-10-CM

## 2024-10-21 PROCEDURE — 96372 THER/PROPH/DIAG INJ SC/IM: CPT | Performed by: INTERNAL MEDICINE

## 2024-10-21 PROCEDURE — 2500000004 HC RX 250 GENERAL PHARMACY W/ HCPCS (ALT 636 FOR OP/ED): Performed by: INTERNAL MEDICINE

## 2024-10-21 RX ORDER — TRIAMTERENE/HYDROCHLOROTHIAZID 37.5-25 MG
1 TABLET ORAL
Qty: 90 TABLET | Refills: 3 | Status: SHIPPED | OUTPATIENT
Start: 2024-10-21

## 2024-11-12 ENCOUNTER — E-VISIT (OUTPATIENT)
Dept: PRIMARY CARE | Facility: CLINIC | Age: 49
End: 2024-11-12
Payer: COMMERCIAL

## 2024-11-19 DIAGNOSIS — E53.8 VITAMIN B12 DEFICIENCY: ICD-10-CM

## 2024-11-19 RX ORDER — CYANOCOBALAMIN 1000 UG/ML
1000 INJECTION, SOLUTION INTRAMUSCULAR; SUBCUTANEOUS ONCE
Status: COMPLETED | OUTPATIENT
Start: 2024-11-22 | End: 2024-11-22

## 2024-11-20 ENCOUNTER — APPOINTMENT (OUTPATIENT)
Dept: NEUROLOGY | Facility: HOSPITAL | Age: 49
End: 2024-11-20
Payer: COMMERCIAL

## 2024-11-22 ENCOUNTER — CLINICAL SUPPORT (OUTPATIENT)
Dept: PRIMARY CARE | Facility: CLINIC | Age: 49
End: 2024-11-22
Payer: COMMERCIAL

## 2024-11-22 DIAGNOSIS — E53.8 VITAMIN B12 DEFICIENCY: ICD-10-CM

## 2024-12-16 ENCOUNTER — OFFICE VISIT (OUTPATIENT)
Dept: URGENT CARE | Age: 49
End: 2024-12-16
Payer: COMMERCIAL

## 2024-12-16 VITALS
RESPIRATION RATE: 20 BRPM | WEIGHT: 240 LBS | OXYGEN SATURATION: 98 % | DIASTOLIC BLOOD PRESSURE: 78 MMHG | HEIGHT: 63 IN | TEMPERATURE: 98.3 F | SYSTOLIC BLOOD PRESSURE: 114 MMHG | HEART RATE: 83 BPM | BODY MASS INDEX: 42.52 KG/M2

## 2024-12-16 DIAGNOSIS — N30.01 ACUTE CYSTITIS WITH HEMATURIA: Primary | ICD-10-CM

## 2024-12-16 DIAGNOSIS — R39.9 UTI SYMPTOMS: ICD-10-CM

## 2024-12-16 LAB
POC APPEARANCE, URINE: CLEAR
POC BILIRUBIN, URINE: NEGATIVE
POC BLOOD, URINE: ABNORMAL
POC COLOR, URINE: ABNORMAL
POC GLUCOSE, URINE: NEGATIVE MG/DL
POC KETONES, URINE: NEGATIVE MG/DL
POC LEUKOCYTES, URINE: ABNORMAL
POC NITRITE,URINE: NEGATIVE
POC PH, URINE: 7.5 PH
POC PROTEIN, URINE: NEGATIVE MG/DL
POC SPECIFIC GRAVITY, URINE: 1.01
POC UROBILINOGEN, URINE: 0.2 EU/DL

## 2024-12-16 PROCEDURE — 87086 URINE CULTURE/COLONY COUNT: CPT

## 2024-12-16 PROCEDURE — 87186 SC STD MICRODIL/AGAR DIL: CPT

## 2024-12-16 PROCEDURE — 99213 OFFICE O/P EST LOW 20 MIN: CPT | Performed by: NURSE PRACTITIONER

## 2024-12-16 PROCEDURE — 81003 URINALYSIS AUTO W/O SCOPE: CPT | Performed by: NURSE PRACTITIONER

## 2024-12-16 PROCEDURE — 3008F BODY MASS INDEX DOCD: CPT | Performed by: NURSE PRACTITIONER

## 2024-12-16 PROCEDURE — 3078F DIAST BP <80 MM HG: CPT | Performed by: NURSE PRACTITIONER

## 2024-12-16 PROCEDURE — 1036F TOBACCO NON-USER: CPT | Performed by: NURSE PRACTITIONER

## 2024-12-16 PROCEDURE — 3074F SYST BP LT 130 MM HG: CPT | Performed by: NURSE PRACTITIONER

## 2024-12-16 RX ORDER — SULFAMETHOXAZOLE AND TRIMETHOPRIM 800; 160 MG/1; MG/1
1 TABLET ORAL 2 TIMES DAILY
Qty: 10 TABLET | Refills: 0 | Status: SHIPPED | OUTPATIENT
Start: 2024-12-16 | End: 2024-12-21

## 2024-12-16 ASSESSMENT — ENCOUNTER SYMPTOMS
HEMATURIA: 1
FREQUENCY: 1

## 2024-12-16 NOTE — PROGRESS NOTES
"Subjective   Patient ID: Sheila Templeton is a 48 y.o. female. They present today with a chief complaint of UTI (Today ).    History of Present Illness  Sheila Templeton is a 48 y.o. female who presents to the clinic for lower abdominal pressure, urinary urgency, frequency, hematuria.  Patient denies any odor.  Patient states this has been ongoing for the past day. Pt denies any chest pain, sob, N/V at this time in clinic.             Past Medical History  Allergies as of 12/16/2024 - Reviewed 12/16/2024   Allergen Reaction Noted    Fluconazole Anaphylaxis and Unknown 08/11/2019       (Not in a hospital admission)       Past Medical History:   Diagnosis Date    Other specified postprocedural states     History of myomectomy    Personal history of other benign neoplasm     History of uterine leiomyoma       Past Surgical History:   Procedure Laterality Date    ENDOMETRIAL ABLATION  05/02/2016    [facility]necologic Services Thermal Endometrial Ablation    OTHER SURGICAL HISTORY  05/02/2016    Laparoscopy Myomectomy    TUBAL LIGATION  05/02/2016    Tubal Ligation        reports that she has never smoked. She has never used smokeless tobacco. She reports that she does not drink alcohol and does not use drugs.    Review of Systems  Review of Systems   Genitourinary:  Positive for frequency, hematuria and urgency.   All other systems reviewed and are negative.                                 Objective    Vitals:    12/16/24 0946   BP: 114/78   Pulse: 83   Resp: 20   Temp: 36.8 °C (98.3 °F)   TempSrc: Temporal   SpO2: 98%   Weight: 109 kg (240 lb)   Height: 1.6 m (5' 3\")     Patient's last menstrual period was 11/01/2016 (approximate).    Physical Exam  Constitutional:       Appearance: Normal appearance.   Abdominal:      General: Abdomen is flat. Bowel sounds are normal.      Palpations: Abdomen is soft.      Tenderness: There is no right CVA tenderness or left CVA tenderness.   Neurological:      General: No focal deficit " present.      Mental Status: She is alert and oriented to person, place, and time. Mental status is at baseline.   Psychiatric:         Mood and Affect: Mood normal.         Behavior: Behavior normal.         Procedures    Point of Care Test & Imaging Results from this visit  No results found for this visit on 12/16/24.   No results found.    Diagnostic study results (if any) were reviewed by JF Santos.    Assessment/Plan   Allergies, medications, history, and pertinent labs/EKGs/Imaging reviewed by JF Santos.     Medical Decision Making  History, examination, and urine dipstick result are consistent with uncomplicated UTI without evidence of pyelonephritis or sepsis. Patient will be given antibiotic therapy and cultures pending. Return to clinic or present to ED if symptoms change or worsen. Otherwise follow with PCP. Patient verbalized understanding and agrees with plan.       Orders and Diagnoses  Diagnoses and all orders for this visit:  Acute cystitis with hematuria  -     sulfamethoxazole-trimethoprim (Bactrim DS) 800-160 mg tablet; Take 1 tablet by mouth 2 times a day for 5 days.  UTI symptoms  -     Urine Culture  -     POCT UA Automated manually resulted      Medical Admin Record      Patient disposition: Home    Electronically signed by JF Santos  10:17 AM

## 2024-12-16 NOTE — PATIENT INSTRUCTIONS
Bactrim for 5 days.  Urine culture send out-if resistant, we will call and change antibiotic.  Follow-up with your primary care doctor in 5 to 7 days.  If worsening symptoms, please go to the emergency department.    Please follow up with your primary provider within one week if symptoms do not improve.  You may schedule an appointment online at [email or web address] or call [phone]. Go to the Emergency Department if symptoms significantly worsen or if you develop chest pain or shortness of breath.

## 2024-12-19 LAB — BACTERIA UR CULT: ABNORMAL

## 2025-04-25 ENCOUNTER — OFFICE VISIT (OUTPATIENT)
Dept: PRIMARY CARE | Facility: CLINIC | Age: 50
End: 2025-04-25
Payer: COMMERCIAL

## 2025-04-25 VITALS
SYSTOLIC BLOOD PRESSURE: 126 MMHG | OXYGEN SATURATION: 99 % | BODY MASS INDEX: 43.75 KG/M2 | TEMPERATURE: 97.2 F | DIASTOLIC BLOOD PRESSURE: 80 MMHG | WEIGHT: 247 LBS | HEART RATE: 89 BPM

## 2025-04-25 DIAGNOSIS — Z00.00 PHYSICAL EXAM: Primary | ICD-10-CM

## 2025-04-25 DIAGNOSIS — R51.9 CHRONIC INTRACTABLE HEADACHE, UNSPECIFIED HEADACHE TYPE: ICD-10-CM

## 2025-04-25 DIAGNOSIS — J45.909 ASTHMA WITHOUT STATUS ASTHMATICUS WITHOUT COMPLICATION, UNSPECIFIED ASTHMA SEVERITY, UNSPECIFIED WHETHER PERSISTENT (HHS-HCC): ICD-10-CM

## 2025-04-25 DIAGNOSIS — G89.29 CHRONIC INTRACTABLE HEADACHE, UNSPECIFIED HEADACHE TYPE: ICD-10-CM

## 2025-04-25 DIAGNOSIS — Z12.31 BREAST CANCER SCREENING BY MAMMOGRAM: ICD-10-CM

## 2025-04-25 PROCEDURE — 3074F SYST BP LT 130 MM HG: CPT | Performed by: INTERNAL MEDICINE

## 2025-04-25 PROCEDURE — 3079F DIAST BP 80-89 MM HG: CPT | Performed by: INTERNAL MEDICINE

## 2025-04-25 PROCEDURE — 90715 TDAP VACCINE 7 YRS/> IM: CPT | Performed by: INTERNAL MEDICINE

## 2025-04-25 PROCEDURE — 99396 PREV VISIT EST AGE 40-64: CPT | Performed by: INTERNAL MEDICINE

## 2025-04-25 PROCEDURE — 90471 IMMUNIZATION ADMIN: CPT | Performed by: INTERNAL MEDICINE

## 2025-04-25 RX ORDER — LORAZEPAM 2 MG/1
TABLET ORAL
COMMUNITY
Start: 2024-09-17

## 2025-04-25 RX ORDER — FLUTICASONE FUROATE AND VILANTEROL TRIFENATATE 100; 25 UG/1; UG/1
1 POWDER RESPIRATORY (INHALATION) DAILY
Qty: 60 EACH | Refills: 6 | Status: SHIPPED | OUTPATIENT
Start: 2025-04-25

## 2025-04-25 RX ORDER — METHYLPREDNISOLONE 4 MG/1
TABLET ORAL
COMMUNITY
Start: 2025-04-19

## 2025-04-25 RX ORDER — GABAPENTIN 300 MG/1
1 CAPSULE ORAL NIGHTLY
COMMUNITY
Start: 2025-03-07 | End: 2025-06-05

## 2025-04-25 ASSESSMENT — PAIN SCALES - GENERAL: PAINLEVEL_OUTOF10: 0-NO PAIN

## 2025-04-25 NOTE — PROGRESS NOTES
Subjective   Patient ID: Sheila Templeton is a 49 y.o. female who presents for Annual Exam.    HPI  1.  Physical exam.  Pt with no concerns today. She does need med RF.  Pap: s/p hysterectomy   Mammogram: last done 9/2023  Colonoscopy: last done 5/2016    Review of Systems  All pertinent positive symptoms are included in the history of present illness.    All other systems have been reviewed and are negative and noncontributory to this patient's current ailments.    Past Medical History:   Diagnosis Date    Other specified postprocedural states     History of myomectomy    Personal history of other benign neoplasm     History of uterine leiomyoma     Past Surgical History:   Procedure Laterality Date    ENDOMETRIAL ABLATION  05/02/2016    [facility]nec[facility] Services Thermal Endometrial Ablation    OTHER SURGICAL HISTORY  05/02/2016    Laparoscopy Myomectomy    TUBAL LIGATION  05/02/2016    Tubal Ligation     Social History     Tobacco Use    Smoking status: Never    Smokeless tobacco: Never   Vaping Use    Vaping status: Never Used   Substance Use Topics    Alcohol use: Never    Drug use: Never     Family History   Problem Relation Name Age of Onset    Hypertension Mother      No Known Problems Father      Asthma Brother      No Known Problems Daughter      No Known Problems Son      Ovarian cancer Maternal Grandmother       Allergies   Allergen Reactions    Fluconazole Anaphylaxis and Unknown     Immunization History   Administered Date(s) Administered    Tdap vaccine, age 7 year and older (BOOSTRIX, ADACEL) 11/08/2011, 04/25/2025     Current Outpatient Medications   Medication Instructions    albuterol (Ventolin HFA) 90 mcg/actuation inhaler 2 puffs, Every 4 hours PRN    BACILLUS COAGULANS-INULIN ORAL 1 capsule, Daily    Breo Ellipta 100-25 mcg/dose inhaler 1 puff, inhalation, Daily    cyclobenzaprine (FLEXERIL) 10 mg, oral, 3 times daily PRN    famotidine (PEPCID) 40 mg, oral, Nightly PRN    gabapentin (Neurontin) 300 mg  capsule 1 capsule, Nightly    hydrocortisone 2.5 % cream 1 APPLICATION TO AFFECTED AREA AS NEEDED EXTERNALLY TWICE A DAY    ibuprofen 600 mg tablet 1 tablet, Every 6 hours PRN    levocetirizine (Xyzal) 5 mg tablet 1 tablet, Every evening    loratadine (Claritin) 10 mg tablet 1 tablet, Daily    LORazepam (Ativan) 2 mg tablet 1 TABLET BY MOUTH PRIOR TO MRI    methylPREDNISolone (Medrol Dospak) 4 mg tablets TAKE AS DIRECTED ON PAMPHLET    rimegepant (NURTEC) 75 mg, oral, Every other day    triamterene-hydrochlorothiazid (Maxzide-25) 37.5-25 mg tablet 1 tablet, oral, Daily before breakfast     Objective   Visit Vitals  /80   Pulse 89   Temp 36.2 °C (97.2 °F)   Wt 112 kg (247 lb)   SpO2 99%   BMI 43.75 kg/m²   Smoking Status Never   BSA 2.23 m²     No visits with results within 1 Month(s) from this visit.   Latest known visit with results is:   Office Visit on 12/16/2024   Component Date Value    Urine Culture 12/16/2024 20,000 - 80,000 CFU/mL Proteus mirabilis (A)     POC Color, Urine 12/16/2024 Straw     POC Appearance, Urine 12/16/2024 Clear     POC Glucose, Urine 12/16/2024 NEGATIVE     POC Bilirubin, Urine 12/16/2024 NEGATIVE     POC Ketones, Urine 12/16/2024 NEGATIVE     POC Specific Gravity, Ur* 12/16/2024 1.010     POC Blood, Urine 12/16/2024 LARGE (3+) (A)     POC PH, Urine 12/16/2024 7.5     POC Protein, Urine 12/16/2024 NEGATIVE     POC Urobilinogen, Urine 12/16/2024 0.2     Poc Nitrite, Urine 12/16/2024 NEGATIVE     POC Leukocytes, Urine 12/16/2024 TRACE (A)      Physical Exam  CONSTITUTIONAL - well nourished, well developed, looks like stated age, in no acute distress, not ill-appearing, and not tired appearing  SKIN - normal skin color and pigmentation, normal skin turgor without rash, lesions, or nodules visualized  HEAD - no trauma, normocephalic  EYES - pupils are equal and reactive to light, extraocular muscles are intact, and normal external exam  ENT - TM's intact, no injection, no signs of  infection, uvula midline, normal tongue movement and throat normal, no exudate, nasal passage without discharge and patent  NECK - supple without rigidity, no neck mass was observed, no thyromegaly or thyroid nodules  CHEST - clear to auscultation, no wheezing, no crackles and no rales, good effort  CARDIAC - regular rate and regular rhythm, no skipped beats, no murmur  ABDOMEN - no organomegaly, soft, nontender, nondistended, normal bowel sounds, no guarding/rebound/rigidity, negative McBurney sign and negative Rubio sign  EXTREMITIES - no edema, no deformities  NEUROLOGICAL - normal gait, normal balance, normal motor, no ataxia, alert, oriented and no focal signs  PSYCHIATRIC - alert, pleasant and cordial, age-appropriate  IMMUNOLOGIC - no cervical lymphadenopathy    Assessment/Plan   Problem List Items Addressed This Visit    None  Visit Diagnoses         Physical exam    -  Primary    Relevant Orders    Lipid Panel      Chronic intractable headache, unspecified headache type        Relevant Medications    rimegepant (NURTEC) 75 mg tablet,disintegrating      Breast cancer screening by mammogram        Relevant Orders    BI mammo bilateral screening tomosynthesis         Healthy diet and exercise encouraged. Low fat, low salt diet. Drink plenty of fluids. Exercise at least 5 times/week for at least 45 minutes per day.

## 2025-05-08 ENCOUNTER — HOSPITAL ENCOUNTER (OUTPATIENT)
Dept: RADIOLOGY | Facility: CLINIC | Age: 50
Discharge: HOME | End: 2025-05-08
Payer: COMMERCIAL

## 2025-05-08 DIAGNOSIS — Z12.31 BREAST CANCER SCREENING BY MAMMOGRAM: ICD-10-CM

## 2025-05-08 PROCEDURE — 77067 SCR MAMMO BI INCL CAD: CPT

## 2025-05-08 PROCEDURE — 77063 BREAST TOMOSYNTHESIS BI: CPT | Performed by: RADIOLOGY

## 2025-05-08 PROCEDURE — 77067 SCR MAMMO BI INCL CAD: CPT | Performed by: RADIOLOGY

## 2025-05-14 ENCOUNTER — APPOINTMENT (OUTPATIENT)
Dept: RADIOLOGY | Facility: CLINIC | Age: 50
End: 2025-05-14
Payer: COMMERCIAL

## 2025-05-14 ENCOUNTER — HOSPITAL ENCOUNTER (OUTPATIENT)
Dept: RADIOLOGY | Facility: CLINIC | Age: 50
Discharge: HOME | End: 2025-05-14
Payer: COMMERCIAL

## 2025-05-14 DIAGNOSIS — R92.8 ABNORMAL MAMMOGRAM OF LEFT BREAST: ICD-10-CM

## 2025-05-14 LAB
CHOLEST SERPL-MCNC: 183 MG/DL
CHOLEST/HDLC SERPL: 2.7 (CALC)
HDLC SERPL-MCNC: 68 MG/DL
LDLC SERPL CALC-MCNC: 93 MG/DL (CALC)
NONHDLC SERPL-MCNC: 115 MG/DL (CALC)
TRIGL SERPL-MCNC: 119 MG/DL

## 2025-05-14 PROCEDURE — 77065 DX MAMMO INCL CAD UNI: CPT | Mod: LEFT SIDE | Performed by: STUDENT IN AN ORGANIZED HEALTH CARE EDUCATION/TRAINING PROGRAM

## 2025-05-14 PROCEDURE — 77061 BREAST TOMOSYNTHESIS UNI: CPT | Mod: LEFT SIDE | Performed by: STUDENT IN AN ORGANIZED HEALTH CARE EDUCATION/TRAINING PROGRAM

## 2025-05-14 PROCEDURE — 77065 DX MAMMO INCL CAD UNI: CPT | Mod: LT

## 2025-06-13 ENCOUNTER — PATIENT MESSAGE (OUTPATIENT)
Dept: PRIMARY CARE | Facility: CLINIC | Age: 50
End: 2025-06-13
Payer: COMMERCIAL

## 2025-06-13 DIAGNOSIS — J45.909 ASTHMA WITHOUT STATUS ASTHMATICUS WITHOUT COMPLICATION, UNSPECIFIED ASTHMA SEVERITY, UNSPECIFIED WHETHER PERSISTENT (HHS-HCC): ICD-10-CM

## 2025-06-13 RX ORDER — ALBUTEROL SULFATE 90 UG/1
2 INHALANT RESPIRATORY (INHALATION) EVERY 4 HOURS PRN
Qty: 18 G | Refills: 3 | Status: SHIPPED | OUTPATIENT
Start: 2025-06-13

## 2025-06-14 DIAGNOSIS — R51.9 CHRONIC INTRACTABLE HEADACHE, UNSPECIFIED HEADACHE TYPE: ICD-10-CM

## 2025-06-14 DIAGNOSIS — G89.29 CHRONIC INTRACTABLE HEADACHE, UNSPECIFIED HEADACHE TYPE: ICD-10-CM

## 2025-06-16 ENCOUNTER — OFFICE VISIT (OUTPATIENT)
Dept: URGENT CARE | Age: 50
End: 2025-06-16
Payer: COMMERCIAL

## 2025-06-16 VITALS
HEART RATE: 65 BPM | SYSTOLIC BLOOD PRESSURE: 145 MMHG | OXYGEN SATURATION: 98 % | DIASTOLIC BLOOD PRESSURE: 76 MMHG | RESPIRATION RATE: 20 BRPM | TEMPERATURE: 97.8 F

## 2025-06-16 DIAGNOSIS — J02.9 SORE THROAT: ICD-10-CM

## 2025-06-16 LAB
POC HUMAN RHINOVIRUS PCR: NEGATIVE
POC INFLUENZA A VIRUS PCR: NEGATIVE
POC INFLUENZA B VIRUS PCR: NEGATIVE
POC RESPIRATORY SYNCYTIAL VIRUS PCR: NEGATIVE
POC STREPTOCOCCUS PYOGENES (GROUP A STREP) PCR: NEGATIVE

## 2025-06-16 RX ORDER — ALBUTEROL SULFATE 90 UG/1
2 INHALANT RESPIRATORY (INHALATION) EVERY 6 HOURS PRN
Qty: 18 G | Refills: 0 | Status: SHIPPED | OUTPATIENT
Start: 2025-06-16 | End: 2026-06-16

## 2025-06-16 RX ORDER — CYCLOBENZAPRINE HCL 10 MG
10 TABLET ORAL 3 TIMES DAILY PRN
Qty: 30 TABLET | Refills: 3 | Status: SHIPPED | OUTPATIENT
Start: 2025-06-16

## 2025-06-16 ASSESSMENT — ENCOUNTER SYMPTOMS
SORE THROAT: 1
VOICE CHANGE: 1
HEADACHES: 0
ADENOPATHY: 0
COUGH: 1
SHORTNESS OF BREATH: 0
CHILLS: 0
FEVER: 0
FATIGUE: 0

## 2025-06-16 ASSESSMENT — PATIENT HEALTH QUESTIONNAIRE - PHQ9
1. LITTLE INTEREST OR PLEASURE IN DOING THINGS: NOT AT ALL
2. FEELING DOWN, DEPRESSED OR HOPELESS: NOT AT ALL
SUM OF ALL RESPONSES TO PHQ9 QUESTIONS 1 AND 2: 0

## 2025-06-16 NOTE — PROGRESS NOTES
Subjective   Patient ID: Sheila Templeton is a 49 y.o. female. They present today with a chief complaint of Sore Throat (4 days. Cough, mucous).    History of Present Illness  Patient is a 50 y/o F c/o cough, sore throat and congestion x 4 days. Patient has been using OTC and allergy meds with some effect.       Sore Throat   Associated symptoms include congestion and coughing. Pertinent negatives include no headaches or shortness of breath.       Past Medical History  Allergies as of 06/16/2025 - Reviewed 06/16/2025   Allergen Reaction Noted    Fluconazole Anaphylaxis and Unknown 08/11/2019       Prescriptions Prior to Admission[1]     Medical History[2]    Surgical History[3]     reports that she has never smoked. She has never used smokeless tobacco. She reports that she does not drink alcohol and does not use drugs.    Review of Systems  Review of Systems   Constitutional:  Negative for chills, fatigue and fever.   HENT:  Positive for congestion, sore throat and voice change.    Respiratory:  Positive for cough. Negative for shortness of breath.    Cardiovascular:  Negative for chest pain.   Neurological:  Negative for headaches.   Hematological:  Negative for adenopathy.   All other systems reviewed and are negative.                                 Objective    Vitals:    06/16/25 1341   BP: 145/76   Pulse: 65   Resp: 20   Temp: 36.6 °C (97.8 °F)   SpO2: 98%     Patient's last menstrual period was 11/01/2016 (approximate).    Physical Exam  Vitals reviewed.   Constitutional:       General: She is not in acute distress.     Appearance: She is not ill-appearing.   HENT:      Head: Normocephalic and atraumatic.      Right Ear: Tympanic membrane normal.      Left Ear: Tympanic membrane normal.   Eyes:      Extraocular Movements: Extraocular movements intact.      Conjunctiva/sclera: Conjunctivae normal.   Pulmonary:      Effort: Pulmonary effort is normal. No respiratory distress.   Neurological:      Mental Status:  She is alert and oriented to person, place, and time.         Procedures    Point of Care Test & Imaging Results from this visit  No results found for this visit on 06/16/25.   Imaging  No results found.    Cardiology, Vascular, and Other Imaging  No other imaging results found for the past 2 days      Diagnostic study results (if any) were reviewed by Ann Lyons MD.    Assessment/Plan   Allergies, medications, history, and pertinent labs/EKGs/Imaging reviewed by Ann Lyons MD.     Medical Decision Making  ***    Orders and Diagnoses  Diagnoses and all orders for this visit:  Sore throat  -     POCT SPOTFIRE R/ST Panel Mini w/Strep A ([facility] [employer]) manually resulted      Medical Admin Record      Patient disposition: Home    Electronically signed by Ann Lyons MD  1:45 PM           [1] (Not in a hospital admission)  [2]   Past Medical History:  Diagnosis Date    Anxiety 7/2023    Asthma     GERD (gastroesophageal reflux disease)     Headache, tension-type 8/1    Hypertension     Other specified postprocedural states     History of myomectomy    Personal history of other benign neoplasm     History of uterine leiomyoma    Weakness of limb 8/1   [3]   Past Surgical History:  Procedure Laterality Date    ENDOMETRIAL ABLATION  05/02/2016    Gynecologic Services Thermal Endometrial Ablation    HYSTERECTOMY  08/2016    OTHER SURGICAL HISTORY  05/02/2016    Laparoscopy Myomectomy    TUBAL LIGATION  05/02/2016    Tubal Ligation

## 2025-07-09 ENCOUNTER — TELEMEDICINE (OUTPATIENT)
Dept: PRIMARY CARE | Facility: CLINIC | Age: 50
End: 2025-07-09
Payer: COMMERCIAL

## 2025-07-09 DIAGNOSIS — L02.91 ABSCESS: Primary | ICD-10-CM

## 2025-07-09 PROCEDURE — 99212 OFFICE O/P EST SF 10 MIN: CPT

## 2025-07-09 PROCEDURE — 1036F TOBACCO NON-USER: CPT

## 2025-07-09 RX ORDER — MUPIROCIN 20 MG/G
OINTMENT TOPICAL 3 TIMES DAILY
Qty: 22 G | Refills: 0 | Status: SHIPPED | OUTPATIENT
Start: 2025-07-09 | End: 2025-07-19

## 2025-07-09 RX ORDER — SULFAMETHOXAZOLE AND TRIMETHOPRIM 800; 160 MG/1; MG/1
1 TABLET ORAL 2 TIMES DAILY
Qty: 20 TABLET | Refills: 0 | Status: SHIPPED | OUTPATIENT
Start: 2025-07-09 | End: 2025-07-19

## 2025-07-09 NOTE — PROGRESS NOTES
aThis visit was completed via video conference. All issues as below were discussed and addressed but no physical exam was performed. If it was felt that the patient should be evaluated in clinic than they were directed there. The patient verbally consented to the visit.    An interactive audio and video telecommunication system which permits real time communications between the patient (at the originating site) and provider (at the distant site) was utilized to provide this telehealth service.     Verbal consent was requested and obtained from Sheila Templeton on this date, 07/09/25 for a telehealth visit.     I have verbally confirmed with Sheila Templeton(or parent if under 18) that they are physically located in the [facility] during this virtual visit.    Call placed [phone] ended [phone]    Subjective   Patient ID: Sheila Templeton is a 49 y.o. female who presents for possible spider bites.     HPI  Spider bite. Left leg and left arm.   Was itching, having pain, using calamine lotion, steroid cream, this is not helping on her leg.  This did help the one on her arm. Has  pustules  about the size of her pinky nail. And the surrounding redness is about a 1/2 dollar size states it is hot, red, and tender. It is not draining.     Current Medications[1]    Objective     Problem List Items Addressed This Visit    None  Visit Diagnoses         Abscess    -  Primary    Relevant Medications    sulfamethoxazole-trimethoprim (Bactrim DS) 800-160 mg tablet    mupirocin (Bactroban) 2 % ointment              Assessment/Plan   Diagnoses and all orders for this visit:  Abscess  -     sulfamethoxazole-trimethoprim (Bactrim DS) 800-160 mg tablet; Take 1 tablet by mouth 2 times a day for 10 days.  -     mupirocin (Bactroban) 2 % ointment; Apply topically 3 times a day for 10 days. apply to affected area  Discussed supportive care, if no improvement seek attention from PCP or Urgent care.                [1]   Current Outpatient  Medications:     Breo Ellipta 100-25 mcg/dose inhaler, INHALE 1 PUFF BY MOUTH ONCE A DAY, Disp: 60 each, Rfl: 6    albuterol (Ventolin HFA) 90 mcg/actuation inhaler, Inhale 2 puffs every 4 hours if needed for shortness of breath., Disp: 18 g, Rfl: 3    albuterol 90 mcg/actuation inhaler, Inhale 2 puffs every 6 hours if needed for wheezing., Disp: 18 g, Rfl: 0    BACILLUS COAGULANS-INULIN ORAL, Take 1 capsule by mouth once daily., Disp: , Rfl:     cyclobenzaprine (Flexeril) 10 mg tablet, TAKE 1 TABLET BY MOUTH 3 TIMES A DAY AS NEEDED FOR MUSCLE SPASMS., Disp: 30 tablet, Rfl: 3    famotidine (Pepcid) 40 mg tablet, Take 1 tablet (40 mg) by mouth as needed at bedtime for heartburn for up to 20 days., Disp: 20 tablet, Rfl: 0    gabapentin (Neurontin) 300 mg capsule, Take 1 capsule (300 mg) by mouth once daily at bedtime., Disp: , Rfl:     hydrocortisone 2.5 % cream, 1 APPLICATION TO AFFECTED AREA AS NEEDED EXTERNALLY TWICE A DAY, Disp: 56.7 g, Rfl: 3    ibuprofen 600 mg tablet, Take 1 tablet (600 mg) by mouth every 6 hours if needed for mild pain (1 - 3)., Disp: , Rfl:     levocetirizine (Xyzal) 5 mg tablet, Take 1 tablet (5 mg) by mouth once daily in the evening., Disp: , Rfl:     loratadine (Claritin) 10 mg tablet, Take 1 tablet (10 mg) by mouth once daily., Disp: , Rfl:     LORazepam (Ativan) 2 mg tablet, 1 TABLET BY MOUTH PRIOR TO MRI, Disp: , Rfl:     methylPREDNISolone (Medrol Dospak) 4 mg tablets, TAKE AS DIRECTED ON PAMPHLET, Disp: , Rfl:     mupirocin (Bactroban) 2 % ointment, Apply topically 3 times a day for 10 days. apply to affected area, Disp: 22 g, Rfl: 0    rimegepant (NURTEC) 75 mg tablet,disintegrating, Dissolve 1 tablet (75 mg) in the mouth every other day., Disp: 16 tablet, Rfl: 5    sulfamethoxazole-trimethoprim (Bactrim DS) 800-160 mg tablet, Take 1 tablet by mouth 2 times a day for 10 days., Disp: 20 tablet, Rfl: 0    triamterene-hydrochlorothiazid (Maxzide-25) 37.5-25 mg tablet, TAKE 1 TABLET BY  MOUTH EVERY DAY IN THE MORNING, Disp: 90 tablet, Rfl: 3